# Patient Record
Sex: MALE | Race: BLACK OR AFRICAN AMERICAN | NOT HISPANIC OR LATINO | URBAN - METROPOLITAN AREA
[De-identification: names, ages, dates, MRNs, and addresses within clinical notes are randomized per-mention and may not be internally consistent; named-entity substitution may affect disease eponyms.]

---

## 2019-10-17 ENCOUNTER — EMERGENCY (EMERGENCY)
Facility: HOSPITAL | Age: 49
LOS: 0 days | Discharge: HOME | End: 2019-10-17
Attending: EMERGENCY MEDICINE
Payer: COMMERCIAL

## 2019-10-17 VITALS — HEIGHT: 72 IN | WEIGHT: 149.91 LBS

## 2019-10-17 VITALS
SYSTOLIC BLOOD PRESSURE: 123 MMHG | HEART RATE: 56 BPM | TEMPERATURE: 98 F | DIASTOLIC BLOOD PRESSURE: 82 MMHG | RESPIRATION RATE: 18 BRPM | OXYGEN SATURATION: 100 %

## 2019-10-17 DIAGNOSIS — S61.217A LACERATION WITHOUT FOREIGN BODY OF LEFT LITTLE FINGER WITHOUT DAMAGE TO NAIL, INITIAL ENCOUNTER: ICD-10-CM
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
## 2019-10-17 PROCEDURE — 99282 EMERGENCY DEPT VISIT SF MDM: CPT | Mod: 25

## 2019-10-17 PROCEDURE — 99283 EMERGENCY DEPT VISIT LOW MDM: CPT

## 2019-10-17 PROCEDURE — 12001 RPR S/N/AX/GEN/TRNK 2.5CM/<: CPT

## 2019-10-17 NOTE — ED STATDOCS - ATTENDING CONTRIBUTION TO CARE
I, Rena Simeon MD,  performed the initial face to face bedside interview with this patient regarding history of present illness, review of symptoms and relevant past medical, social and family history.  I completed an independent physical examination.  I was the initial provider who evaluated this patient. I have signed out the follow up of any pending tests (i.e. labs, radiological studies) to the ACP.  I have communicated the patient’s plan of care and disposition with the ACP.  The history, relevant review of systems, past medical and surgical history, medical decision making, and physical examination was documented by the scribe in my presence and I attest to the accuracy of the documentation.

## 2019-10-17 NOTE — ED STATDOCS - PATIENT PORTAL LINK FT
You can access the FollowMyHealth Patient Portal offered by [facility] by registering at the following website: [email or web address]. By joining [facility]’s FollowMyHealth portal, you will also be able to view your health information using other applications (apps) compatible with our system.

## 2019-10-17 NOTE — ED STATDOCS - PROGRESS NOTE DETAILS
48 yr. old male [facility]: None reported presents to ED with laceration to left 5th finger on knife at home today. Last Tdaap ?? Seen and examined by attending in intake. Plan: Irrigate and apply Dermabond. Will F/U with results and re evaluate. Meredith NP

## 2019-10-17 NOTE — ED STATDOCS - SKIN, MLM
+left 5th digit 1 cm laceration to distal lateral nail and tip of finger. Distal neuro motor intact. Not actively bleeding,

## 2019-10-17 NOTE — ED ADULT NURSE NOTE - SUICIDE SCREENING QUESTION 2
Spoke with pharmacy, regarding refill of atorvastatin 10 mg.  Pt has current refills on file  rx denied  
No

## 2019-10-17 NOTE — ED PROCEDURE NOTE - PROCEDURE ADDITIONAL DETAILS
5th Left Finger: Irrigated with saline, Demabond applied to 1.2 cm laceration of 5th distal phalanx through tip of nail. Tolerated well. MTangredi NP

## 2020-07-05 ENCOUNTER — EMERGENCY (EMERGENCY)
Facility: HOSPITAL | Age: 50
LOS: 0 days | Discharge: HOME | End: 2020-07-05
Attending: STUDENT IN AN ORGANIZED HEALTH CARE EDUCATION/TRAINING PROGRAM
Payer: COMMERCIAL

## 2020-07-05 VITALS
SYSTOLIC BLOOD PRESSURE: 128 MMHG | DIASTOLIC BLOOD PRESSURE: 85 MMHG | OXYGEN SATURATION: 100 % | TEMPERATURE: 98 F | RESPIRATION RATE: 16 BRPM | HEART RATE: 65 BPM

## 2020-07-05 VITALS — WEIGHT: 175.93 LBS | HEIGHT: 72 IN
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
## 2020-07-05 DIAGNOSIS — S61.216A LACERATION WITHOUT FOREIGN BODY OF RIGHT LITTLE FINGER WITHOUT DAMAGE TO NAIL, INITIAL ENCOUNTER: ICD-10-CM
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
## 2020-07-05 PROCEDURE — 12002 RPR S/N/AX/GEN/TRNK2.6-7.5CM: CPT

## 2020-07-05 PROCEDURE — 99282 EMERGENCY DEPT VISIT SF MDM: CPT | Mod: 25

## 2020-07-05 PROCEDURE — 99282 EMERGENCY DEPT VISIT SF MDM: CPT

## 2020-07-05 NOTE — ED STATDOCS - NSFOLLOWUPINSTRUCTIONS_ED_ALL_ED_FT
KEEP DRESSING ON X 24 HOURS. COVER WITH BANDAID AFTERWARDS. RETURN TO ED IN EVENT OF FEVER, SWELLING OF FINGER, INABILITY TO MOBILIZE FINGER.    Laceration    WHAT YOU NEED TO KNOW:    A laceration is an injury to the skin and the soft tissue underneath it. Lacerations happen when you are cut or hit by something. They can happen anywhere on the body.     DISCHARGE INSTRUCTIONS:    Return to the emergency department if:     You have heavy bleeding or bleeding that does not stop after 10 minutes of holding firm, direct pressure over the wound.       Your wound opens up.     Contact your healthcare provider if:     You have a fever or chills.       Your laceration is red, warm, or swollen.      You have red streaks on your skin coming from your wound.      You have white or yellow drainage from the wound that smells bad.      You have pain that gets worse, even after treatment.       You have questions or concerns about your condition or care.     Medicines:     Prescription pain medicine may be given. Ask how to take this medicine safely.       Antibiotics help treat or prevent a bacterial infection.       Take your medicine as directed. Contact your healthcare provider if you think your medicine is not helping or if you have side effects. Tell him or her if you are allergic to any medicine. Keep a list of the medicines, vitamins, and herbs you take. Include the amounts, and when and why you take them. Bring the list or the pill bottles to follow-up visits. Carry your medicine list with you in case of an emergency.    Care for your wound as directed:     Do not get your wound wet until your healthcare provider says it is okay. Do not soak your wound in water. Do not go swimming until your healthcare provider says it is okay. Carefully wash the wound with soap and water. Gently pat the area dry or allow it to air dry.       Change your bandages when they get wet, dirty, or after washing. Apply new, clean bandages as directed. Do not apply elastic bandages or tape too tight. Do not put powders or lotions over your incision.       Apply antibiotic ointment as directed. Your healthcare provider may give you antibiotic ointment to put over your wound if you have stitches. If you have strips of tape over your incision, let them dry up and fall off on their own. If they do not fall off within 14 days, gently remove them. If you have glue over your wound, do not remove or pick at it. If your glue comes off, do not replace it with glue that you have at home.       Check your wound every day for signs of infection such as swelling, redness, or pus.     Self-care:     Apply ice on your wound for 15 to 20 minutes every hour or as directed. Use an ice pack, or put crushed ice in a plastic bag. Cover it with a towel. Ice helps prevent tissue damage and decreases swelling and pain.      Use a splint as directed. A splint will decrease movement and stress on your wound. It may help it heal faster. A splint may be used for lacerations over joints or areas of your body that bend. Ask your healthcare provider how to apply and remove a splint.       Decrease scarring of your wound by applying ointments as directed. Do not apply ointments until your healthcare provider says it is okay. You may need to wait until your wound is healed. Ask which ointment to buy and how often to use it. After your wound is healed, use sunscreen over the area when you are out in the sun. You should do this for at least 6 months to 1 year after your injury.     Follow up with your healthcare provider as directed: You may need to follow up in 24 to 48 hours to have your wound checked for infection. You will need to return in 3 to 14 days if you have stitches or staples so they can be removed. Care for your wound as directed to prevent infection and help it heal. Write down your questions so you remember to ask them during your visits.

## 2020-07-05 NOTE — ED STATDOCS - PROGRESS NOTE DETAILS
48 y/o m with no PMH presents with right 5th finger laceration which occurred apx 30 minutes PTA. pt states he was moving a metal BBQ frame and he felt sharp pain in his finger. tetanus up to date. PE: Well appearing. MSK: +skin flap over palmar aspect 5th digit right hand with active bleeding. Sensation intact to light touch. Cap refill < 2 sec in upper extremities. Full ROM in UE digits. A/P: Laceration. Plan for repair and dc home. - Portillo Mejia PA-C

## 2020-07-05 NOTE — ED STATDOCS - SKIN, MLM
skin normal color for race, warm, dry and intact. skin normal color for race, warm, dry. +Less than .5 cm avulsion to distal R 5th digit volar aspect

## 2020-07-05 NOTE — ED ADULT NURSE NOTE - NSIMPLEMENTINTERV_GEN_ALL_ED
Implemented All Universal Safety Interventions:  [place] to call system. Call bell, personal items and telephone within reach. Instruct patient to call for assistance. Room bathroom lighting operational. Non-slip footwear when patient is off stretcher. Physically safe environment: no spills, clutter or unnecessary equipment. Stretcher in lowest position, wheels locked, appropriate side rails in place.

## 2020-07-05 NOTE — ED STATDOCS - PATIENT PORTAL LINK FT
You can access the FollowMyHealth Patient Portal offered by [facility] by registering at the following website: [email or web address]. By joining [facility]’s FollowMyHealth portal, you will also be able to view your health information using other applications (apps) compatible with our system.

## 2020-07-05 NOTE — ED STATDOCS - CLINICAL SUMMARY MEDICAL DECISION MAKING FREE TEXT BOX
50 y/o M presents to the ED with avulsion. Plan: Wound care 50 y/o M presents to the ED with skin avulsion. Plan: Wound care

## 2020-07-05 NOTE — ED STATDOCS - ATTENDING CONTRIBUTION TO CARE
I, Zenobia Villarreal DO,  performed the initial face to face bedside interview with this patient regarding history of present illness, review of symptoms and relevant past medical, social and family history.  I completed an independent physical examination.  I was the initial provider who evaluated this patient. I have signed out the follow up of any pending tests (i.e. labs, radiological studies) to the ACP.  I have communicated the patient’s plan of care and disposition with the ACP.  The history, relevant review of systems, past medical and surgical history, medical decision making, and physical examination was documented by the scribe in my presence and I attest to the accuracy of the documentation.

## 2020-07-05 NOTE — ED STATDOCS - OBJECTIVE STATEMENT
48 y/o M with no pertinent PMHx presenting to the ED c/o R finger laceration x30 minutes PTA.  Patient report that he was pulling metal grill when he sliced his R 5th digit. Came to the ED for possible repair. Denies numbness, tingling in finger. Allergic to penicillin. 48 y/o M with no pertinent PMHx presenting to the ED c/o R finger laceration x30 minutes PTA.  Patient report that he was pulling metal grill when he cut his R 5th digit. Came to the ED for possible repair. Patient is RHD. Denies numbness, tingling in finger. Allergic to penicillin. Tetanus shot is UTD.

## 2020-07-05 NOTE — ED ADULT TRIAGE NOTE - CHIEF COMPLAINT QUOTE
Pt comes in for right fifth finger laceration. Believes he cut it on a metal grill. Bleeding under control in triage. States he is utd on tetanus.

## 2020-07-06 PROBLEM — Z78.9 OTHER SPECIFIED HEALTH STATUS: Chronic | Status: ACTIVE | Noted: 2019-10-17

## 2021-03-09 ENCOUNTER — OUTPATIENT (OUTPATIENT)
Dept: OUTPATIENT SERVICES | Facility: HOSPITAL | Age: 51
LOS: 1 days | End: 2021-03-09
Payer: COMMERCIAL

## 2021-03-09 DIAGNOSIS — Z20.828 CONTACT WITH AND (SUSPECTED) EXPOSURE TO OTHER VIRAL COMMUNICABLE DISEASES: ICD-10-CM

## 2021-03-09 LAB — SARS-COV-2 RNA SPEC QL NAA+PROBE: SIGNIFICANT CHANGE UP

## 2021-03-09 PROCEDURE — U0003: CPT

## 2021-03-09 PROCEDURE — C9803: CPT

## 2021-03-09 PROCEDURE — U0005: CPT

## 2021-03-10 DIAGNOSIS — Z20.828 CONTACT WITH AND (SUSPECTED) EXPOSURE TO OTHER VIRAL COMMUNICABLE DISEASES: ICD-10-CM

## 2023-06-11 ENCOUNTER — INPATIENT (INPATIENT)
Facility: HOSPITAL | Age: 53
LOS: 1 days | Discharge: HOME | DRG: 74 | End: 2023-06-13
Attending: HOSPITALIST | Admitting: INTERNAL MEDICINE
Payer: COMMERCIAL

## 2023-06-11 VITALS
OXYGEN SATURATION: 100 % | WEIGHT: 174.61 LBS | RESPIRATION RATE: 16 BRPM | SYSTOLIC BLOOD PRESSURE: 130 MMHG | DIASTOLIC BLOOD PRESSURE: 72 MMHG | TEMPERATURE: 99 F | HEART RATE: 56 BPM

## 2023-06-11 DIAGNOSIS — I63.9 CEREBRAL INFARCTION, UNSPECIFIED: ICD-10-CM

## 2023-06-11 LAB
ALBUMIN SERPL ELPH-MCNC: 3.5 G/DL — SIGNIFICANT CHANGE UP (ref 3.3–5)
ALP SERPL-CCNC: 71 U/L — SIGNIFICANT CHANGE UP (ref 40–120)
ALT FLD-CCNC: 24 U/L — SIGNIFICANT CHANGE UP (ref 12–78)
ANION GAP SERPL CALC-SCNC: 5 MMOL/L — SIGNIFICANT CHANGE UP (ref 5–17)
APTT BLD: 32.5 SEC — SIGNIFICANT CHANGE UP (ref 27.5–35.5)
AST SERPL-CCNC: 9 U/L — LOW (ref 15–37)
BASOPHILS # BLD AUTO: 0.04 K/UL — SIGNIFICANT CHANGE UP (ref 0–0.2)
BASOPHILS NFR BLD AUTO: 0.7 % — SIGNIFICANT CHANGE UP (ref 0–2)
BILIRUB SERPL-MCNC: 0.6 MG/DL — SIGNIFICANT CHANGE UP (ref 0.2–1.2)
BUN SERPL-MCNC: 9 MG/DL — SIGNIFICANT CHANGE UP (ref 7–23)
CALCIUM SERPL-MCNC: 9.1 MG/DL — SIGNIFICANT CHANGE UP (ref 8.5–10.1)
CHLORIDE SERPL-SCNC: 103 MMOL/L — SIGNIFICANT CHANGE UP (ref 96–108)
CO2 SERPL-SCNC: 30 MMOL/L — SIGNIFICANT CHANGE UP (ref 22–31)
CREAT SERPL-MCNC: 1.11 MG/DL — SIGNIFICANT CHANGE UP (ref 0.5–1.3)
EGFR: 80 ML/MIN/1.73M2 — SIGNIFICANT CHANGE UP
EOSINOPHIL # BLD AUTO: 0.16 K/UL — SIGNIFICANT CHANGE UP (ref 0–0.5)
EOSINOPHIL NFR BLD AUTO: 2.7 % — SIGNIFICANT CHANGE UP (ref 0–6)
GLUCOSE SERPL-MCNC: 89 MG/DL — SIGNIFICANT CHANGE UP (ref 70–99)
HCT VFR BLD CALC: 43.5 % — SIGNIFICANT CHANGE UP (ref 39–50)
HGB BLD-MCNC: 14.3 G/DL — SIGNIFICANT CHANGE UP (ref 13–17)
IMM GRANULOCYTES NFR BLD AUTO: 0.7 % — SIGNIFICANT CHANGE UP (ref 0–0.9)
INR BLD: 1.11 RATIO — SIGNIFICANT CHANGE UP (ref 0.88–1.16)
LYMPHOCYTES # BLD AUTO: 2.72 K/UL — SIGNIFICANT CHANGE UP (ref 1–3.3)
LYMPHOCYTES # BLD AUTO: 46.7 % — HIGH (ref 13–44)
MCHC RBC-ENTMCNC: 28.7 PG — SIGNIFICANT CHANGE UP (ref 27–34)
MCHC RBC-ENTMCNC: 32.9 GM/DL — SIGNIFICANT CHANGE UP (ref 32–36)
MCV RBC AUTO: 87.2 FL — SIGNIFICANT CHANGE UP (ref 80–100)
MONOCYTES # BLD AUTO: 0.71 K/UL — SIGNIFICANT CHANGE UP (ref 0–0.9)
MONOCYTES NFR BLD AUTO: 12.2 % — SIGNIFICANT CHANGE UP (ref 2–14)
NEUTROPHILS # BLD AUTO: 2.15 K/UL — SIGNIFICANT CHANGE UP (ref 1.8–7.4)
NEUTROPHILS NFR BLD AUTO: 37 % — LOW (ref 43–77)
PLATELET # BLD AUTO: 180 K/UL — SIGNIFICANT CHANGE UP (ref 150–400)
POTASSIUM SERPL-MCNC: 4.3 MMOL/L — SIGNIFICANT CHANGE UP (ref 3.5–5.3)
POTASSIUM SERPL-SCNC: 4.3 MMOL/L — SIGNIFICANT CHANGE UP (ref 3.5–5.3)
PROT SERPL-MCNC: 8.1 GM/DL — SIGNIFICANT CHANGE UP (ref 6–8.3)
PROTHROM AB SERPL-ACNC: 12.9 SEC — SIGNIFICANT CHANGE UP (ref 10.5–13.4)
RBC # BLD: 4.99 M/UL — SIGNIFICANT CHANGE UP (ref 4.2–5.8)
RBC # FLD: 14.7 % — HIGH (ref 10.3–14.5)
SODIUM SERPL-SCNC: 138 MMOL/L — SIGNIFICANT CHANGE UP (ref 135–145)
TROPONIN I, HIGH SENSITIVITY RESULT: 5.11 NG/L — SIGNIFICANT CHANGE UP
TROPONIN I, HIGH SENSITIVITY RESULT: 6.45 NG/L — SIGNIFICANT CHANGE UP
WBC # BLD: 5.82 K/UL — SIGNIFICANT CHANGE UP (ref 3.8–10.5)
WBC # FLD AUTO: 5.82 K/UL — SIGNIFICANT CHANGE UP (ref 3.8–10.5)

## 2023-06-11 PROCEDURE — 93306 TTE W/DOPPLER COMPLETE: CPT

## 2023-06-11 PROCEDURE — 78452 HT MUSCLE IMAGE SPECT MULT: CPT

## 2023-06-11 PROCEDURE — 97162 PT EVAL MOD COMPLEX 30 MIN: CPT | Mod: GP

## 2023-06-11 PROCEDURE — 93010 ELECTROCARDIOGRAM REPORT: CPT

## 2023-06-11 PROCEDURE — 70551 MRI BRAIN STEM W/O DYE: CPT

## 2023-06-11 PROCEDURE — 99285 EMERGENCY DEPT VISIT HI MDM: CPT

## 2023-06-11 PROCEDURE — A9500: CPT

## 2023-06-11 PROCEDURE — 70498 CT ANGIOGRAPHY NECK: CPT | Mod: 26,MA

## 2023-06-11 PROCEDURE — 93017 CV STRESS TEST TRACING ONLY: CPT

## 2023-06-11 PROCEDURE — 85027 COMPLETE CBC AUTOMATED: CPT

## 2023-06-11 PROCEDURE — 71045 X-RAY EXAM CHEST 1 VIEW: CPT

## 2023-06-11 PROCEDURE — 80061 LIPID PANEL: CPT

## 2023-06-11 PROCEDURE — 0042T: CPT | Mod: MA

## 2023-06-11 PROCEDURE — 71045 X-RAY EXAM CHEST 1 VIEW: CPT | Mod: 26

## 2023-06-11 PROCEDURE — 82607 VITAMIN B-12: CPT

## 2023-06-11 PROCEDURE — G0378: CPT

## 2023-06-11 PROCEDURE — 97116 GAIT TRAINING THERAPY: CPT | Mod: GP

## 2023-06-11 PROCEDURE — 99223 1ST HOSP IP/OBS HIGH 75: CPT | Mod: GC

## 2023-06-11 PROCEDURE — 84443 ASSAY THYROID STIM HORMONE: CPT

## 2023-06-11 PROCEDURE — 80048 BASIC METABOLIC PNL TOTAL CA: CPT

## 2023-06-11 PROCEDURE — 92523 SPEECH SOUND LANG COMPREHEN: CPT | Mod: GN

## 2023-06-11 PROCEDURE — 70496 CT ANGIOGRAPHY HEAD: CPT | Mod: 26,MA

## 2023-06-11 PROCEDURE — 84484 ASSAY OF TROPONIN QUANT: CPT

## 2023-06-11 PROCEDURE — 82746 ASSAY OF FOLIC ACID SERUM: CPT

## 2023-06-11 PROCEDURE — 92610 EVALUATE SWALLOWING FUNCTION: CPT | Mod: GN

## 2023-06-11 PROCEDURE — 83036 HEMOGLOBIN GLYCOSYLATED A1C: CPT

## 2023-06-11 PROCEDURE — 36415 COLL VENOUS BLD VENIPUNCTURE: CPT

## 2023-06-11 RX ORDER — FERROUS SULFATE 325(65) MG
1 TABLET ORAL
Refills: 0 | DISCHARGE

## 2023-06-11 RX ORDER — POLYETHYLENE GLYCOL 3350 17 G/17G
17 POWDER, FOR SOLUTION ORAL DAILY
Refills: 0 | Status: DISCONTINUED | OUTPATIENT
Start: 2023-06-11 | End: 2023-06-13

## 2023-06-11 RX ORDER — SENNA PLUS 8.6 MG/1
2 TABLET ORAL AT BEDTIME
Refills: 0 | Status: DISCONTINUED | OUTPATIENT
Start: 2023-06-11 | End: 2023-06-13

## 2023-06-11 RX ORDER — ASPIRIN/CALCIUM CARB/MAGNESIUM 324 MG
324 TABLET ORAL ONCE
Refills: 0 | Status: COMPLETED | OUTPATIENT
Start: 2023-06-11 | End: 2023-06-11

## 2023-06-11 RX ORDER — CHOLECALCIFEROL (VITAMIN D3) 125 MCG
1 CAPSULE ORAL
Refills: 0 | DISCHARGE

## 2023-06-11 RX ORDER — MAGNESIUM OXIDE 400 MG ORAL TABLET 241.3 MG
400 TABLET ORAL DAILY
Refills: 0 | Status: DISCONTINUED | OUTPATIENT
Start: 2023-06-11 | End: 2023-06-13

## 2023-06-11 RX ORDER — MAGNESIUM OXIDE 400 MG ORAL TABLET 241.3 MG
1 TABLET ORAL
Refills: 0 | DISCHARGE

## 2023-06-11 RX ORDER — HEPARIN SODIUM 5000 [USP'U]/ML
5000 INJECTION INTRAVENOUS; SUBCUTANEOUS EVERY 12 HOURS
Refills: 0 | Status: DISCONTINUED | OUTPATIENT
Start: 2023-06-11 | End: 2023-06-13

## 2023-06-11 RX ORDER — FERROUS SULFATE 325(65) MG
325 TABLET ORAL DAILY
Refills: 0 | Status: DISCONTINUED | OUTPATIENT
Start: 2023-06-11 | End: 2023-06-13

## 2023-06-11 RX ORDER — ATORVASTATIN CALCIUM 80 MG/1
80 TABLET, FILM COATED ORAL AT BEDTIME
Refills: 0 | Status: DISCONTINUED | OUTPATIENT
Start: 2023-06-11 | End: 2023-06-13

## 2023-06-11 RX ADMIN — Medication 324 MILLIGRAM(S): at 17:53

## 2023-06-11 NOTE — PATIENT PROFILE ADULT - STATED REASON FOR ADMISSION
Lt thumb/hand numbness and tingling   Lt thumb/hand numbness and tingling Toes also Chest pressure

## 2023-06-11 NOTE — ED ADULT NURSE NOTE - CHIEF COMPLAINT QUOTE
sudden onset numbness left thumb 11am. progressed to index and now up arm. pt having blurry vision and "slight CP." no facial droop, no arm drift, no aphasia. ambulatory

## 2023-06-11 NOTE — ED ADULT NURSE NOTE - OBJECTIVE STATEMENT
52 year old male found laying on stretcher complaining of arm and hand numbness that has progressed up since arm since 11am with no improvement.

## 2023-06-11 NOTE — ED PROVIDER NOTE - CARE PLAN
1 Principal Discharge DX:	CVA (cerebrovascular accident)  Secondary Diagnosis:	Numbness and tingling

## 2023-06-11 NOTE — H&P ADULT - ATTENDING COMMENTS
52 year old male w hx anemia, active   who came to ED c/o paresthesia to L thumb and L toe, then tightness of arm radiating to chest    CT head no acute changes  CTA H+N no LVO  EKG NSR  telemetry HR 40-60 sinus    since ED presentation after ASA, feels improvement in paresthesias although still present    #Paresthesias  poss TIA  NIHSS 0  CT and CT H+N/perf reviewed  1. admit to telemetry  2. neuro check q 4 hrs  3. MRI  4. ECHO  5. neuro  6. asa   7. statin  8. check lipids and Hb A1c  9. check B12, folate, TSH      #Chest tightness  1. serial trop  2. asa  3. statin      #Anemia  states FHx in father  recently started iron  why anemic not clear      #VTE  as per orders 52 year old male w hx anemia, active   who came to ED c/o paresthesia to L thumb and L toe, then tightness of arm radiating to chest    CT head no acute changes  CTA H+N no LVO  EKG NSR  telemetry HR 40-60 sinus    since ED presentation after ASA, feels improvement in paresthesias although still present    #Paresthesias  poss TIA  NIHSS 0  CT and CT H+N/ perf reviewed  1. admit to telemetry  2. neuro check q 4 hrs  3. MRI brain  4. ECHO  5. neuro consult  6. asa 81 mg  7. statin 80 mg  8. check lipids and Hb A1c  9. check B12, folate, TSH      #Chest tightness  1. serial trop  2. asa  3. statin  4. EKG  NSR  5. echo as above      #hx Anemia  states FHx in father  pt states recently started iron  why anemic not clear       current Hb nl w nl indices      #VTE  as per orders

## 2023-06-11 NOTE — H&P ADULT - ASSESSMENT
DVT ppx   -  #TIA/rule out stroke   - Experienced paresthesias in upper and lower extremity - now improving   - CT head and CT angio head/neck unremarkable as above   - Maintain aspiration precautions and keep HOB elevated   - F/u lipid panel and HgbA1c in AM  - Start atorvastatin 80 mg at bedtime  - F/u neuro consult   - F/u cardio consult   - F/u PT   - F/u speech pathology       #Substernal Chest pain   #Rule out ACS   - Chest pain now resolved   - Family Hx - father passed away with stroke in his 70s - had hx of HLD and T2DM  - EKG - normal sinus rhythm   - Troponin negative x 2; f/u third troponin   - F/u ECHO   - EKG in AM   - Cardiology consult   - Tele monitoring     #DVT ppx  - Heparin SubQ    #Code Status   - FULL CODE    Above discussed with Dr. Arevalo.    #TIA/rule out stroke   - Experienced paresthesias in upper and lower extremity - now improving   - CT head and CT angio head/neck unremarkable as above   - Maintain aspiration precautions and keep HOB elevated   - F/u lipid panel and HgbA1c in AM  - Start atorvastatin 80 mg at bedtime  - F/u MRI of head  - F/u neuro consult   - F/u cardio consult   - F/u PT   - F/u speech pathology       #Substernal Chest pain   #Rule out ACS   - Chest pain now resolved   - Family Hx - father passed away with stroke in his 70s - had hx of HLD and T2DM  - EKG - normal sinus rhythm   - Troponin negative x 2; f/u third troponin   - F/u ECHO   - EKG in AM   - Cardiology consult   - Tele monitoring     #DVT ppx  - Heparin SubQ    #Code Status   - FULL CODE    Above discussed with Dr. Arevalo.

## 2023-06-11 NOTE — H&P ADULT - NSHPSOCIALHISTORY_GEN_ALL_CORE
- lives with wife   Occupation:  in [place]   Never uses tobacco, recreational drugs, or alcohol   Fully-functional at home  - lives with wife   Occupation:  in [employer]; also a    Never uses tobacco, recreational drugs, or alcohol   Fully-functional at home

## 2023-06-11 NOTE — ED PROVIDER NOTE - CLINICAL SUMMARY MEDICAL DECISION MAKING FREE TEXT BOX
51 yo M with paresthesia of L hand and L arm, mild CP, and paresthesia of L medial foot. NIHSS 0, outside of TPA window, no LVO imaging, no medical hx, but family hx of +CVA. Will need full cardiac and CVA workup, likely admission. 53 yo M with paresthesia of L hand and L arm, mild CP, and paresthesia of L medial foot. NIHSS 0, outside of TPA window, no LVO imaging, no medical hx, but family hx of +CVA. Will need full cardiac and CVA workup, likely admission.  -workup unremarkable. Pt remains at baseline, sx not worsening. CTA w/o LVO, no acute abnormalities. Pt will require admission for further CVA workup

## 2023-06-11 NOTE — ED ADULT NURSE NOTE - NSFALLUNIVINTERV_ED_ALL_ED
Bed/Stretcher in lowest position, wheels locked, appropriate side rails in place/Call bell, personal items and telephone in reach/Instruct patient to call for assistance before getting out of bed/chair/stretcher/Non-slip footwear applied when patient is off stretcher/[place] to call system/Physically safe environment - no spills, clutter or unnecessary equipment/Purposeful proactive rounding/Room/bathroom lighting operational, light cord in reach

## 2023-06-11 NOTE — ED PROVIDER NOTE - OBJECTIVE STATEMENT
53 yo M with no pertinent PMHx presents to the ED c/o LUE numbness x 11 am today, started from L thumb that gradually spread up to L arm.  Not on AC. 51 yo M with no pertinent PMHx presents to the ED c/o mid CP, LUE numbness, tingling, and tightness x 11 am today, started from L thumb that gradually spread up to L arm. States he was not doing anything in particular. Also c/o slight numbness to L toe. Not on AC. Denies chest tightness, SOB, vomiting, lower extremities pain, facial tingling or numbness. Reports nothing out of the ordinary this week, no recent travels, no recent falls or trauma. Denies any medical hx, denies EtOH, drugs, or smoker. Reports that father had a stroke in his mid 60s. Reports similar tightness and tingling pain when pt gets acid reflux, but the numbness is new so he came to ED for further evaluation.

## 2023-06-11 NOTE — H&P ADULT - NSHPPHYSICALEXAM_GEN_ALL_CORE
T(C): 36.8 (06-11-23 @ 19:18), Max: 37.1 (06-11-23 @ 16:31)  HR: 59 (06-11-23 @ 19:18) (56 - 59)  BP: 131/77 (06-11-23 @ 19:18) (130/72 - 131/77)  RR: 18 (06-11-23 @ 19:18) (16 - 18)  SpO2: 99% (06-11-23 @ 19:18) (99% - 100%)    CONSTITUTIONAL: Well groomed, no apparent distress  EYES: EOMI, conjunctiva clear and sclera white  ENMT: Oral mucosa with moist membranes   NECK: Supple  RESP: No respiratory distress, no use of accessory muscles  CV: RRR, +S1S2  GI: Soft, NT, ND, no rebound, no guarding  MSK: Normal ROM without pain in all 4 extremities, no spinal tenderness  SKIN: No rashes or ulcers noted  NEURO:  sensation intact in upper and lower extremities b/l to light touch   PSYCH: Appropriate insight/judgment; A+O x 3 T(C): 36.8 (06-11-23 @ 19:18), Max: 37.1 (06-11-23 @ 16:31)  HR: 59 (06-11-23 @ 19:18) (56 - 59)  BP: 131/77 (06-11-23 @ 19:18) (130/72 - 131/77)  RR: 18 (06-11-23 @ 19:18) (16 - 18)  SpO2: 99% (06-11-23 @ 19:18) (99% - 100%)    CONSTITUTIONAL: Well groomed, no apparent distress  EYES: EOMI, conjunctiva clear and sclera white  ENMT: Oral mucosa with moist membranes   NECK: Supple, no bruits  RESP: No respiratory distress, no use of accessory muscles  CV: RRR, +S1S2  GI: Soft, NT, ND, no rebound, no guarding  MSK: Normal ROM without pain in all 4 extremities, no spinal tenderness  SKIN: No rashes or ulcers noted  NEURO:  sensation intact in upper and lower extremities b/l to light touch   PSYCH: Appropriate insight/judgment; A+O x 3

## 2023-06-11 NOTE — H&P ADULT - HISTORY OF PRESENT ILLNESS
Patient is a 53 y/o M with no significant PMH presening to the ED on 6/11/23 for     Upon arrival to the ED, /72 HR 56 RR 16 and T 98.8 F.  NIHSS of 0. CT Brain Perfusion with no evidence of perfusion deficit. CT Angio  Neck/Brain Stroke protocol with no evidence of large vessel occlusion, significant stenosis, vascular malformation or aneurysm within the neck/intracranial circulation. He received 324 mg of aspirin.  Patient is a 51 y/o M with no significant PMH presening to the ED on 6/11/23 for upper extremity numbness that started in his thumbs and radiated up the arms (L sided more than R) as well as L toe numbness. He also mentions a tightness in his L arm that then radiated to his chest (substernal location). At the time of my evaluation he reports that most of his symptoms have resolved except for some residual numbness in his fingers. He denies palpitations, shortness of breath, nausea, vomiting, headaches, dizziness, abdominal pain, urinary frequency/dysuria, sudden confusion, trouble speaking, vision problems, and loss of balance.     Upon arrival to the ED, /72 HR 56 RR 16 and T 98.8 F.  NIHSS of 0. EKG was normal sinus rhythm. CT Brain Perfusion with no evidence of perfusion deficit. CT Angio  Neck/Brain Stroke protocol with no evidence of large vessel occlusion, significant stenosis, vascular malformation or aneurysm within the neck/intracranial circulation. He received 324 mg of aspirin.     No prior hospital admissions.    Patient is a 53 y/o M with no significant PMH presenting to the ED on 6/11/23 for upper extremity numbness that started in his thumbs and radiated up the arms (L sided more than R) as well as L toe numbness.    He also mentions a tightness in his L arm that then radiated to his chest (substernal location). At the time of my evaluation he reports that most of his symptoms have resolved except for some residual numbness in his fingers. He denies palpitations, shortness of breath, nausea, vomiting, headaches, dizziness, abdominal pain, urinary frequency/dysuria, sudden confusion, trouble speaking, vision problems, and loss of balance.     Upon arrival to the ED, /72 HR 56 RR 16 and T 98.8 F.  NIHSS of 0. EKG was normal sinus rhythm. CT Brain Perfusion with no evidence of perfusion deficit. CT Angio  Neck/Brain Stroke protocol with no evidence of large vessel occlusion, significant stenosis, vascular malformation or aneurysm within the neck/intracranial circulation. He received 324 mg of aspirin.     No prior hospital admissions.

## 2023-06-11 NOTE — PHARMACOTHERAPY INTERVENTION NOTE - COMMENTS
Attempted to contact patient at [phone]. Unavailable. Left detailed msg on voice identified answering machine to return call.    Please read today's result letter to patient.     Medication history complete. Medications and allergies reviewed with patient and confirmed with . Patient states that he is not currently taking prescription medications.

## 2023-06-11 NOTE — ED ADULT TRIAGE NOTE - CHIEF COMPLAINT QUOTE
sudden onset numbness left thumb 11am. progressed to index and now up arm. pt having blurry vision and "slight CP." no facial droop, no arm drift, no aphasia. ambulatory sudden onset numbness left thumb 11am. progressed to index and now up arm. pt having blurry vision and "slight CP." no facial droop, no arm drift, no aphasia. ambulatory. Dr Pires to [place]ilda, code stroke called

## 2023-06-11 NOTE — ED STATDOCS - PROGRESS NOTE DETAILS
Shazia Piña for attending Dr. Pires: 53 y/o male presents to the ED c/o LUE numbness. pt reports around 11am today he had onset of numbness to his left thumb that gradually spread to the rest of his fingers and up his arm. Not on anticoagulation. Will send pt to main ED for further evaluation.

## 2023-06-11 NOTE — H&P ADULT - NSICDXFAMILYHX_GEN_ALL_CORE_FT
FAMILY HISTORY:  Father  Still living? No  Family history of stroke, Age at diagnosis: Age Unknown    Mother  Still living? Yes, Estimated age: Age Unknown  Family hx of colon cancer, Age at diagnosis: Age Unknown

## 2023-06-11 NOTE — ED PROVIDER NOTE - NS ED ROS FT
Constitutional: No fevers, chills, or sweats.  Cardiac: No chest pain, exertional dyspnea, orthopnea  Respiratory: No shortness of breath, no cough  GI: No abdominal pain, no N/V/D  Neuro: No headaches, no neck pain/stiffness, + numbness RUE  All other systems reviewed and are negative unless otherwise stated in the HPI. Constitutional: No fevers, chills, or sweats.  Cardiac: No chest tightness, exertional dyspnea, orthopnea, + mid CP  Respiratory: No shortness of breath, no cough  GI: No abdominal pain, no N/V/D  Neuro: No headaches, +LUE numbness, tingling, and tightness   All other systems reviewed and are negative unless otherwise stated in the HPI.

## 2023-06-11 NOTE — PATIENT PROFILE ADULT - FALL HARM RISK - UNIVERSAL INTERVENTIONS
Bed in lowest position, wheels locked, appropriate side rails in place/Call bell, personal items and telephone in reach/Instruct patient to call for assistance before getting out of bed or chair/Non-slip footwear when patient is out of bed/[place] to call system/Physically safe environment - no spills, clutter or unnecessary equipment/Purposeful Proactive Rounding/Room/bathroom lighting operational, light cord in reach

## 2023-06-11 NOTE — ED PROVIDER NOTE - PHYSICAL EXAMINATION
General: AAOx3, NAD  HEENT: NCAT  Cardiac: Normal rate and rhythm, no murmurs, normal peripheral perfusion  Respiratory: Normal rate and effort. CTAB  GI: Soft, nondistended, nontender  Neuro: No focal deficits. FAUSTIN equally x4, sensation to light touch intact throughout  MSK: FROMx4, no focal bony tenderness, no peripheral edema  Skin: No rash General: AAOx3, NAD  HEENT: NCAT  Cardiac: Normal rate and rhythm, no murmurs, normal peripheral perfusion  Respiratory: Normal rate and effort. CTAB  GI: Soft, nondistended, nontender  Neuro: No focal deficits. FAUSTIN equally x4, paresthesia and subjective sensory asymmetry in L medial hand and L medial foot compared to R, but sensation to light touch intact throughout. CN2-12 intact, speech clear, strength 5/5 in all extremities. NIHSS=0.  MSK: FROMx4, no focal bony tenderness, no peripheral edema  Skin: No rash

## 2023-06-12 DIAGNOSIS — E78.5 HYPERLIPIDEMIA, UNSPECIFIED: ICD-10-CM

## 2023-06-12 DIAGNOSIS — R07.9 CHEST PAIN, UNSPECIFIED: ICD-10-CM

## 2023-06-12 DIAGNOSIS — R07.89 OTHER CHEST PAIN: ICD-10-CM

## 2023-06-12 LAB
A1C WITH ESTIMATED AVERAGE GLUCOSE RESULT: 5.9 % — HIGH (ref 4–5.6)
ANION GAP SERPL CALC-SCNC: 3 MMOL/L — LOW (ref 5–17)
BUN SERPL-MCNC: 8 MG/DL — SIGNIFICANT CHANGE UP (ref 7–23)
CALCIUM SERPL-MCNC: 9.1 MG/DL — SIGNIFICANT CHANGE UP (ref 8.5–10.1)
CHLORIDE SERPL-SCNC: 103 MMOL/L — SIGNIFICANT CHANGE UP (ref 96–108)
CHOLEST SERPL-MCNC: 208 MG/DL — HIGH
CO2 SERPL-SCNC: 31 MMOL/L — SIGNIFICANT CHANGE UP (ref 22–31)
CREAT SERPL-MCNC: 1.07 MG/DL — SIGNIFICANT CHANGE UP (ref 0.5–1.3)
EGFR: 84 ML/MIN/1.73M2 — SIGNIFICANT CHANGE UP
ESTIMATED AVERAGE GLUCOSE: 123 MG/DL — HIGH (ref 68–114)
FOLATE SERPL-MCNC: 9.4 NG/ML — SIGNIFICANT CHANGE UP
GLUCOSE SERPL-MCNC: 91 MG/DL — SIGNIFICANT CHANGE UP (ref 70–99)
HCT VFR BLD CALC: 45.9 % — SIGNIFICANT CHANGE UP (ref 39–50)
HDLC SERPL-MCNC: 69 MG/DL — SIGNIFICANT CHANGE UP
HGB BLD-MCNC: 15.1 G/DL — SIGNIFICANT CHANGE UP (ref 13–17)
LIPID PNL WITH DIRECT LDL SERPL: 124 MG/DL — HIGH
MCHC RBC-ENTMCNC: 29.2 PG — SIGNIFICANT CHANGE UP (ref 27–34)
MCHC RBC-ENTMCNC: 32.9 GM/DL — SIGNIFICANT CHANGE UP (ref 32–36)
MCV RBC AUTO: 88.8 FL — SIGNIFICANT CHANGE UP (ref 80–100)
NON HDL CHOLESTEROL: 139 MG/DL — HIGH
PLATELET # BLD AUTO: 198 K/UL — SIGNIFICANT CHANGE UP (ref 150–400)
POTASSIUM SERPL-MCNC: 4.1 MMOL/L — SIGNIFICANT CHANGE UP (ref 3.5–5.3)
POTASSIUM SERPL-SCNC: 4.1 MMOL/L — SIGNIFICANT CHANGE UP (ref 3.5–5.3)
RBC # BLD: 5.17 M/UL — SIGNIFICANT CHANGE UP (ref 4.2–5.8)
RBC # FLD: 14.7 % — HIGH (ref 10.3–14.5)
SODIUM SERPL-SCNC: 137 MMOL/L — SIGNIFICANT CHANGE UP (ref 135–145)
TRIGL SERPL-MCNC: 73 MG/DL — SIGNIFICANT CHANGE UP
TROPONIN I, HIGH SENSITIVITY RESULT: 6.57 NG/L — SIGNIFICANT CHANGE UP
TSH SERPL-MCNC: 2.9 UU/ML — SIGNIFICANT CHANGE UP (ref 0.34–4.82)
VIT B12 SERPL-MCNC: 715 PG/ML — SIGNIFICANT CHANGE UP (ref 232–1245)
WBC # BLD: 4.43 K/UL — SIGNIFICANT CHANGE UP (ref 3.8–10.5)
WBC # FLD AUTO: 4.43 K/UL — SIGNIFICANT CHANGE UP (ref 3.8–10.5)

## 2023-06-12 PROCEDURE — 99223 1ST HOSP IP/OBS HIGH 75: CPT

## 2023-06-12 PROCEDURE — 70551 MRI BRAIN STEM W/O DYE: CPT | Mod: 26

## 2023-06-12 PROCEDURE — 99232 SBSQ HOSP IP/OBS MODERATE 35: CPT | Mod: GC

## 2023-06-12 PROCEDURE — 93306 TTE W/DOPPLER COMPLETE: CPT | Mod: 26

## 2023-06-12 RX ORDER — ASPIRIN/CALCIUM CARB/MAGNESIUM 324 MG
1 TABLET ORAL
Qty: 30 | Refills: 0
Start: 2023-06-12 | End: 2023-07-11

## 2023-06-12 RX ORDER — LANOLIN ALCOHOL/MO/W.PET/CERES
5 CREAM (GRAM) TOPICAL ONCE
Refills: 0 | Status: COMPLETED | OUTPATIENT
Start: 2023-06-12 | End: 2023-06-12

## 2023-06-12 RX ORDER — ASPIRIN/CALCIUM CARB/MAGNESIUM 324 MG
81 TABLET ORAL DAILY
Refills: 0 | Status: DISCONTINUED | OUTPATIENT
Start: 2023-06-12 | End: 2023-06-13

## 2023-06-12 RX ORDER — ATORVASTATIN CALCIUM 80 MG/1
1 TABLET, FILM COATED ORAL
Qty: 30 | Refills: 0
Start: 2023-06-12 | End: 2023-07-11

## 2023-06-12 RX ADMIN — ATORVASTATIN CALCIUM 80 MILLIGRAM(S): 80 TABLET, FILM COATED ORAL at 00:36

## 2023-06-12 RX ADMIN — Medication 325 MILLIGRAM(S): at 09:28

## 2023-06-12 RX ADMIN — Medication 81 MILLIGRAM(S): at 09:28

## 2023-06-12 RX ADMIN — Medication 5 MILLIGRAM(S): at 22:27

## 2023-06-12 RX ADMIN — ATORVASTATIN CALCIUM 80 MILLIGRAM(S): 80 TABLET, FILM COATED ORAL at 22:27

## 2023-06-12 RX ADMIN — POLYETHYLENE GLYCOL 3350 17 GRAM(S): 17 POWDER, FOR SOLUTION ORAL at 09:28

## 2023-06-12 RX ADMIN — MAGNESIUM OXIDE 400 MG ORAL TABLET 400 MILLIGRAM(S): 241.3 TABLET ORAL at 09:28

## 2023-06-12 NOTE — CONSULT NOTE ADULT - ASSESSMENT
51 y/o ma with no significant PMH presenting to the ED on 6/11/23 for upper extremities numbness, resolved. Non focal exam, CT/MR head showed no acute changes, CT angios no LVO. Doubt ischemic event. Poss CTS.  Suggest:  can f/u with my office when d/c  no further neuro recommendations

## 2023-06-12 NOTE — PROGRESS NOTE ADULT - SUBJECTIVE AND OBJECTIVE BOX
Pt has been seen and examined with FP resident, resident supervised agree with a/p       Patient is a 52y old  Male who presents with a chief complaint of TIA/rule out CVA (12 Jun 2023 14:45)      PHYSICAL EXAM:  Vital Signs Last 24 Hrs  T(C): 36.6 (11 Jun 2023 23:51), Max: 37.1 (11 Jun 2023 16:31)  T(F): 97.9 (11 Jun 2023 23:51), Max: 98.8 (11 Jun 2023 16:31)  HR: 52 (12 Jun 2023 08:36) (50 - 59)  BP: 115/84 (12 Jun 2023 08:36) (115/84 - 131/77)  BP(mean): 95 (11 Jun 2023 21:55) (93 - 95)  RR: 18 (12 Jun 2023 08:36) (16 - 18)  SpO2: 100% (12 Jun 2023 08:36) (99% - 100%)    Parameters below as of 12 Jun 2023 08:36  Patient On (Oxygen Delivery Method): room air      -rs-aeeb, cta  -cvs-s1s2 normal   -p/a-soft,bs+      A/P    #d/c today with further management as an outpt, if cleared by neurology and cardiology     #time spent 45 minutes  Pt has been seen and examined with FP resident, resident supervised agree with a/p       Patient is a 52y old  Male who presents with a chief complaint of TIA/rule out CVA (12 Jun 2023 14:45)      PHYSICAL EXAM:  Vital Signs Last 24 Hrs  T(C): 36.6 (11 Jun 2023 23:51), Max: 37.1 (11 Jun 2023 16:31)  T(F): 97.9 (11 Jun 2023 23:51), Max: 98.8 (11 Jun 2023 16:31)  HR: 52 (12 Jun 2023 08:36) (50 - 59)  BP: 115/84 (12 Jun 2023 08:36) (115/84 - 131/77)  BP(mean): 95 (11 Jun 2023 21:55) (93 - 95)  RR: 18 (12 Jun 2023 08:36) (16 - 18)  SpO2: 100% (12 Jun 2023 08:36) (99% - 100%)    Parameters below as of 12 Jun 2023 08:36  Patient On (Oxygen Delivery Method): room air      -rs-aeeb, cta  -cvs-s1s2 normal   -p/a-soft,bs+      A/P    #d/c today with further management as an outpt, if cleared by neurology and cardiology     #time spent 45 minutes     #Prediabetic -educated pt about it -life style modification advised

## 2023-06-12 NOTE — CONSULT NOTE ADULT - SUBJECTIVE AND OBJECTIVE BOX
CHIEF COMPLAINT: Patient is a 52y old  Male who presents with a chief complaint of TIA/rule out CVA (12 Jun 2023 11:16)      HPI:  Patient is a 53 y/o M with no significant PMH presenting to the ED on 6/11/23 for upper extremity numbness that started in his thumbs and radiated up the arms (L sided more than R) as well as L toe numbness.    He also mentions a tightness in his L arm that then radiated to his chest (substernal location). At the time of my evaluation he reports that most of his symptoms have resolved except for some residual numbness in his fingers. He denies palpitations, shortness of breath, nausea, vomiting, headaches, dizziness, abdominal pain, urinary frequency/dysuria, sudden confusion, trouble speaking, vision problems, and loss of balance.     Upon arrival to the ED, /72 HR 56 RR 16 and T 98.8 F.  NIHSS of 0. EKG was normal sinus rhythm. CT Brain Perfusion with no evidence of perfusion deficit. CT Angio  Neck/Brain Stroke protocol with no evidence of large vessel occlusion, significant stenosis, vascular malformation or aneurysm within the neck/intracranial circulation. He received 324 mg of aspirin.     Cardiology consulted to evaluate for chest pain. Pt. with non exertional right chest pressure associated with left arm numbness that lasted for  few hrs yesterday, cardiac enzymes negative x 3, chest pain free, denies SOB/palpitations, MRI - no CVA. Pt. with no previous cardiac Hx or cardiac Fx.       No prior hospital admissions.    (11 Jun 2023 20:14)      PAST MEDICAL / SURGICAL HISTORY:  PAST MEDICAL & SURGICAL HISTORY:  No pertinent past medical history      No significant past surgical history          SOCIAL HISTORY:   Alcohol: Denied  Smoking: Nonsmoker  Drug Use: Denied  Marital Status:     FAMILY HISTORY: FAMILY HISTORY:  Family history of stroke (Father)    Family hx of colon cancer (Mother)        MEDICATIONS  (STANDING):  aspirin  chewable 81 milliGRAM(s) Oral daily  atorvastatin 80 milliGRAM(s) Oral at bedtime  ferrous    sulfate 325 milliGRAM(s) Oral daily  heparin   Injectable 5000 Unit(s) SubCutaneous every 12 hours  magnesium oxide 400 milliGRAM(s) Oral daily  polyethylene glycol 3350 17 Gram(s) Oral daily  senna 2 Tablet(s) Oral at bedtime    MEDICATIONS  (PRN):      Allergies    penicillins (Unknown)    Intolerances        REVIEW OF SYSTEMS:  CONSTITUTIONAL: No weakness, fevers or chills  Eyes: No visual changes  NECK: No pain or stiffness  RESPIRATORY: No cough, wheezing, hemoptysis; No shortness of breath  CARDIOVASCULAR: + chest pain or palpitations  GASTROINTESTINAL: No abdominal pain. No nausea, vomiting, or hematemesis; No diarrhea or constipation. No melena or hematochezia.  GENITOURINARY: No dysuria, frequency or hematuria  NEUROLOGICAL: + UE numbness.  SKIN: No itching or rash  All other review of systems is negative unless indicated above    VITAL SIGNS:   Vital Signs Last 24 Hrs  T(C): 36.6 (11 Jun 2023 23:51), Max: 37.1 (11 Jun 2023 16:31)  T(F): 97.9 (11 Jun 2023 23:51), Max: 98.8 (11 Jun 2023 16:31)  HR: 52 (12 Jun 2023 08:36) (50 - 59)  BP: 115/84 (12 Jun 2023 08:36) (115/84 - 131/77)  BP(mean): 95 (11 Jun 2023 21:55) (93 - 95)  RR: 18 (12 Jun 2023 08:36) (16 - 18)  SpO2: 100% (12 Jun 2023 08:36) (99% - 100%)    Parameters below as of 12 Jun 2023 08:36  Patient On (Oxygen Delivery Method): room air        I&O's Summary      PHYSICAL EXAM:  Constitutional: NAD, awake and alert  HEENT:  EOMI,  Pupils round, No oral cyanosis.  Pulmonary: Non-labored, breath sounds are clear bilaterally, No wheezing, rales or rhonchi  Cardiovascular: S1 and S2, regular rate and rhythm, no Murmurs, gallops or rubs  Gastrointestinal: Bowel Sounds present, soft, nontender.   Lymph: No peripheral edema. No cervical lymphadenopathy.  Neurological: Alert, no focal deficits  Skin: No rashes.  Psych:  Mood & affect appropriate    LABS:                        15.1   4.43  )-----------( 198      ( 12 Jun 2023 08:24 )             45.9                         14.3   5.82  )-----------( 180      ( 11 Jun 2023 16:42 )             43.5     12 Jun 2023 08:24    137    |  103    |  8      ----------------------------<  91     4.1     |  31     |  1.07   11 Jun 2023 16:42    138    |  103    |  9      ----------------------------<  89     4.3     |  30     |  1.11     Ca    9.1        12 Jun 2023 08:24  Ca    9.1        11 Jun 2023 16:42    TPro  8.1    /  Alb  3.5    /  TBili  0.6    /  DBili  x      /  AST  9      /  ALT  24     /  AlkPhos  71     11 Jun 2023 16:42    PT/INR - ( 11 Jun 2023 16:42 )   PT: 12.9 sec;   INR: 1.11 ratio         PTT - ( 11 Jun 2023 16:42 )  PTT:32.5 sec        06-12 @ 08:24  TSH: 2.90    Radiology/Echo: reviewed    < from: MR Head No Cont (06.12.23 @ 09:25) >  IMPRESSION: No acute hemorrhage mass mass effect or acute territorial   infarcts seen.    --- End of Report ---    < end of copied text >     CHIEF COMPLAINT: Patient is a 52y old  Male who presents with a chief complaint of TIA/rule out CVA (12 Jun 2023 11:16)      HPI:  Patient is a 53 y/o M with no significant PMH presenting to the ED on 6/11/23 for upper extremity numbness that started in his thumbs and radiated up the arms (L sided more than R) as well as L toe numbness.    He also mentions a tightness in his L arm that then radiated to his chest (substernal location). At the time of my evaluation he reports that most of his symptoms have resolved except for some residual numbness in his fingers. He denies palpitations, shortness of breath, nausea, vomiting, headaches, dizziness, abdominal pain, urinary frequency/dysuria, sudden confusion, trouble speaking, vision problems, and loss of balance.     Upon arrival to the ED, /72 HR 56 RR 16 and T 98.8 F.  NIHSS of 0. EKG was normal sinus rhythm. CT Brain Perfusion with no evidence of perfusion deficit. CT Angio  Neck/Brain Stroke protocol with no evidence of large vessel occlusion, significant stenosis, vascular malformation or aneurysm within the neck/intracranial circulation. He received 324 mg of aspirin.     Cardiology consulted to evaluate for chest pain. Pt. with non exertional right chest pressure associated with left arm numbness that lasted for  few hrs yesterday, cardiac enzymes negative x 3, chest pain free, positive Fx - father with heart attack in his mid 60s.     No prior hospital admissions.    (11 Jun 2023 20:14)      PAST MEDICAL / SURGICAL HISTORY:  PAST MEDICAL & SURGICAL HISTORY:  No pertinent past medical history      No significant past surgical history          SOCIAL HISTORY:   Alcohol: Denied  Smoking: Nonsmoker  Drug Use: Denied  Marital Status:     FAMILY HISTORY: FAMILY HISTORY:  Family history of stroke (Father)    Family hx of colon cancer (Mother)        MEDICATIONS  (STANDING):  aspirin  chewable 81 milliGRAM(s) Oral daily  atorvastatin 80 milliGRAM(s) Oral at bedtime  ferrous    sulfate 325 milliGRAM(s) Oral daily  heparin   Injectable 5000 Unit(s) SubCutaneous every 12 hours  magnesium oxide 400 milliGRAM(s) Oral daily  polyethylene glycol 3350 17 Gram(s) Oral daily  senna 2 Tablet(s) Oral at bedtime    MEDICATIONS  (PRN):      Allergies    penicillins (Unknown)    Intolerances        REVIEW OF SYSTEMS:  CONSTITUTIONAL: No weakness, fevers or chills  Eyes: No visual changes  NECK: No pain or stiffness  RESPIRATORY: No cough, wheezing, hemoptysis; No shortness of breath  CARDIOVASCULAR: + chest pain or palpitations  GASTROINTESTINAL: No abdominal pain. No nausea, vomiting, or hematemesis; No diarrhea or constipation. No melena or hematochezia.  GENITOURINARY: No dysuria, frequency or hematuria  NEUROLOGICAL: + UE numbness.  SKIN: No itching or rash  All other review of systems is negative unless indicated above    VITAL SIGNS:   Vital Signs Last 24 Hrs  T(C): 36.6 (11 Jun 2023 23:51), Max: 37.1 (11 Jun 2023 16:31)  T(F): 97.9 (11 Jun 2023 23:51), Max: 98.8 (11 Jun 2023 16:31)  HR: 52 (12 Jun 2023 08:36) (50 - 59)  BP: 115/84 (12 Jun 2023 08:36) (115/84 - 131/77)  BP(mean): 95 (11 Jun 2023 21:55) (93 - 95)  RR: 18 (12 Jun 2023 08:36) (16 - 18)  SpO2: 100% (12 Jun 2023 08:36) (99% - 100%)    Parameters below as of 12 Jun 2023 08:36  Patient On (Oxygen Delivery Method): room air        I&O's Summary      PHYSICAL EXAM:  Constitutional: NAD, awake and alert  HEENT:  EOMI,  Pupils round, No oral cyanosis.  Pulmonary: Non-labored, breath sounds are clear bilaterally, No wheezing, rales or rhonchi  Cardiovascular: S1 and S2, regular rate and rhythm, no Murmurs, gallops or rubs  Gastrointestinal: Bowel Sounds present, soft, nontender.   Lymph: No peripheral edema. No cervical lymphadenopathy.  Neurological: Alert, no focal deficits  Skin: No rashes.  Psych:  Mood & affect appropriate    LABS:                        15.1   4.43  )-----------( 198      ( 12 Jun 2023 08:24 )             45.9                         14.3   5.82  )-----------( 180      ( 11 Jun 2023 16:42 )             43.5     12 Jun 2023 08:24    137    |  103    |  8      ----------------------------<  91     4.1     |  31     |  1.07   11 Jun 2023 16:42    138    |  103    |  9      ----------------------------<  89     4.3     |  30     |  1.11     Ca    9.1        12 Jun 2023 08:24  Ca    9.1        11 Jun 2023 16:42    TPro  8.1    /  Alb  3.5    /  TBili  0.6    /  DBili  x      /  AST  9      /  ALT  24     /  AlkPhos  71     11 Jun 2023 16:42    PT/INR - ( 11 Jun 2023 16:42 )   PT: 12.9 sec;   INR: 1.11 ratio         PTT - ( 11 Jun 2023 16:42 )  PTT:32.5 sec        06-12 @ 08:24  TSH: 2.90    Radiology/Echo: reviewed    < from: TTE Echo Complete w/o Contrast w/ Doppler (06.12.23 @ 12:16) >   Impression     Summary     The left ventricle is normal in size, wall thickness, wall motion .   Mildly reduced left ventricular systolic function.   Estimated left ventricular ejection fraction is 40-45 %.   Normal appearing mitral valve structure and function.   Mild (1+) mitral regurgitation is present.   Normal appearing tricuspid valve structure and function.   At least Trace tricuspid valve regurgitation is present.     Signature     ----------------------------------------------------------------   Electronically signed by Jonh Colin MD(Interpreting   physician) on 06/12/2023 03:26 PM   --------------------------------------------    < end of copied text >      < from: MR Head No Cont (06.12.23 @ 09:25) >  IMPRESSION: No acute hemorrhage mass mass effect or acute territorial   infarcts seen.    --- End of Report ---    < end of copied text >

## 2023-06-12 NOTE — SWALLOW BEDSIDE ASSESSMENT ADULT - NS SPL SWALLOW CLINIC TRIAL FT
Pt exhibited Oropharyngeal Swallowing integrity which subjectively appeared to be functional for age. Bolus formation/transfer were mechanically functional for age and laryngeal lift on palpation during swallowing trials was felt to be functional for age as well. NO behavioral aspiration signs exhibited. No change in O2 sats noted. Odynophagia was denied.

## 2023-06-12 NOTE — SWALLOW BEDSIDE ASSESSMENT ADULT - SWALLOW EVAL: DIAGNOSIS
1) Pt exhibits Oropharyngeal Swallowing integrity which subjectively appeared to be functional for age. Bolus formation/transfer were mechanically functional for age and laryngeal lift on palpation during swallowing trials was felt to be functional for age as well. NO behavioral aspiration signs exhibited. No change in O2 sats noted. Odynophagia was denied.

## 2023-06-12 NOTE — SWALLOW BEDSIDE ASSESSMENT ADULT - SWALLOW EVAL: RECOMMENDED DIET
SUGGEST A REGULAR TEXTURE DIET WITH THIN LIQUID CONSISTENCIES AS THE PATIENT APPEARED CLINICALLY TOLERANT OF THESE FOOD TEXTURES FROM AN OROPHARYNGEAL SWALLOWING PERSPECTIVE ON EXAM.

## 2023-06-12 NOTE — PROGRESS NOTE ADULT - SUBJECTIVE AND OBJECTIVE BOX
Patient is a 51 y/o M with PMHx HLD prediabetes presenting to  ED on 6/11/23 for upper extremity numbness that started in his thumbs and radiated up the arms (L sided more than R) as well as L toe numbness.    He also mentions a tightness in his L arm that then radiated to his chest (substernal location). At the time of my evaluation he reports that most of his symptoms have resolved except for some residual numbness in his fingers. He denies palpitations, shortness of breath, nausea, vomiting, headaches, dizziness, abdominal pain, urinary frequency/dysuria, sudden confusion, trouble speaking, vision problems, and loss of balance.     Upon arrival to the ED, /72 HR 56 RR 16 and T 98.8 F.  NIHSS of 0. EKG was normal sinus rhythm. CT Brain Perfusion with no evidence of perfusion deficit. CT Angio  Neck/Brain Stroke protocol with no evidence of large vessel occlusion, significant stenosis, vascular malformation or aneurysm within the neck/intracranial circulation. He received 324 mg of aspirin.     No prior hospital admissions.     6/12/23: Patient seen and examined at bedside. AAOx3, no complaints. Chest pain resolved. mild numbness only in his first 2 digits of left hand      Objective:   Vital Signs Last 24 Hrs  T(C): 36.6 (11 Jun 2023 23:51), Max: 36.8 (11 Jun 2023 19:18)  T(F): 97.9 (11 Jun 2023 23:51), Max: 98.2 (11 Jun 2023 19:18)  HR: 52 (12 Jun 2023 08:36) (50 - 59)  BP: 115/84 (12 Jun 2023 08:36) (115/84 - 131/77)  BP(mean): 95 (11 Jun 2023 21:55) (93 - 95)  RR: 18 (12 Jun 2023 08:36) (16 - 18)  SpO2: 100% (12 Jun 2023 08:36) (99% - 100%)    O2 Parameters below as of 12 Jun 2023 08:36  Patient On (Oxygen Delivery Method): room air    GENERAL: A&Ox3, non-toxic appearing, no acute distress  HEENT: NCAT, EOMI, oral mucosa moist, pink conjunctiva  RESP: CTABL  CV: RRR, no murmurs/rubs/gallops  ABDOMEN: soft, non-tender, non-distended, no guarding, no CVA tenderness  MSK: no visible deformities, 5/5 strength and FROM all 4 extremities   NEURO: no focal sensory or motor deficits, CN 2-12 grossly intact, sensation intact in all 4 extremities, speech clear   SKIN: warm, normal color, well perfused, no rash  PSYCH: normal affect,     Labs:                           15.1   4.43  )-----------( 198      ( 12 Jun 2023 08:24 )             45.9     06-12    137  |  103  |  8   ----------------------------<  91  4.1   |  31  |  1.07    Ca    9.1      12 Jun 2023 08:24    TPro  8.1  /  Alb  3.5  /  TBili  0.6  /  DBili  x   /  AST  9<L>  /  ALT  24  /  AlkPhos  71  06-11      Radiology:     < from: MR Head No Cont (06.12.23 @ 09:25) >  INTERPRETATION:  Clinical indication: Upper extremity numbness.    MRI of the brain was performed sagittal T1 axial T1 T2 T2 FLAIR diffusion   and gradient echo sequence.    The lateral ventricles have a normal configuration    Abnormal T2 prolongation in the appearance white matter region is   identified which is nonspecific. These findings could be related to   migraine headaches though possibilities include areas infection/ischemia   or demyelinating disease.    No acute hemorrhage mass or mass effect is seen.    The large vessels demonstrate normal flow voids.    The visualized paranasal sinuses mastoid and middle ear regions appear   clear.    IMPRESSION: No acute hemorrhage mass mass effect or acute territorial   infarcts seen.    --- End of Report ---    < end of copied text >  < from: CT Brain Perfusion Maps Stroke (06.11.23 @ 16:49) >  IMPRESSION:    No acute intracranial abnormality.    These findings were discussed with Dr. Mensah on 6/11/2023, 1644 hours.    No evidence of CT perfusion deficit.    No evidence of large vessel occlusion, significant stenosis, vascular   malformation or aneurysm within the neck/intracranial circulation.    < end of copied text >      --------------  MEDICATIONS  (STANDING):  aspirin  chewable 81 milliGRAM(s) Oral daily  atorvastatin 80 milliGRAM(s) Oral at bedtime  ferrous    sulfate 325 milliGRAM(s) Oral daily  heparin   Injectable 5000 Unit(s) SubCutaneous every 12 hours  magnesium oxide 400 milliGRAM(s) Oral daily  polyethylene glycol 3350 17 Gram(s) Oral daily  senna 2 Tablet(s) Oral at bedtime

## 2023-06-12 NOTE — DISCHARGE NOTE NURSING/CASE MANAGEMENT/SOCIAL WORK - NSDCPEFALRISK_GEN_ALL_CORE
For information on Fall & Injury Prevention, visit: [email or web address] OR  [email or web address] OR  https://www.cdc.gov/steadi/patient.html

## 2023-06-12 NOTE — CONSULT NOTE ADULT - SUBJECTIVE AND OBJECTIVE BOX
Neurology Consult requested by:   Patient is a 52y old  Male who presents with a chief complaint of TIA/rule out CVA (11 Jun 2023 20:14)     HPI:  Patient is a 53 y/o man with no significant PMH presenting to the ED on 6/11/23 for upper extremity numbness that started in his thumbs and radiated up the arms (L sided more than R) as well as L toe numbness.  He also mentions a tightness in his L arm that then radiated to his chest (substernal location). At present no complaints.  He denies palpitations, shortness of breath, nausea, vomiting, headaches, dizziness, abdominal pain, urinary frequency/dysuria, sudden confusion, trouble speaking, vision problems, and loss of balance.       PAST MEDICAL & SURGICAL HISTORY:  No pertinent past medical history      No significant past surgical history        FAMILY HISTORY:  Family history of stroke (Father)    Family hx of colon cancer (Mother)      Social Hx:  Nonsmoker, no drug or alcohol use  Medications and Allergies ReviewedMEDICATIONS  (STANDING):  aspirin  chewable 81 milliGRAM(s) Oral daily  atorvastatin 80 milliGRAM(s) Oral at bedtime  ferrous    sulfate 325 milliGRAM(s) Oral daily  heparin   Injectable 5000 Unit(s) SubCutaneous every 12 hours  magnesium oxide 400 milliGRAM(s) Oral daily  polyethylene glycol 3350 17 Gram(s) Oral daily  senna 2 Tablet(s) Oral at bedtime     ROS: Pertinent positives in HPI, all other ROS were reviewed and are negative.      Examination:   Vital Signs Last 24 Hrs  T(C): 36.6 (11 Jun 2023 23:51), Max: 37.1 (11 Jun 2023 16:31)  T(F): 97.9 (11 Jun 2023 23:51), Max: 98.8 (11 Jun 2023 16:31)  HR: 52 (12 Jun 2023 08:36) (50 - 59)  BP: 115/84 (12 Jun 2023 08:36) (115/84 - 131/77)  BP(mean): 95 (11 Jun 2023 21:55) (93 - 95)  RR: 18 (12 Jun 2023 08:36) (16 - 18)  SpO2: 100% (12 Jun 2023 08:36) (99% - 100%)    Parameters below as of 12 Jun 2023 08:36  Patient On (Oxygen Delivery Method): room air      General: Cooperative, NAD   NECK: supple, no masses  ENT: Normal hearing   Vascular : no carotid bruits,   Lungs: CTAB  Chest: RRR, no murmurs  Extremities: nontender, no edema, + Phalen's sign  Musculoskeletal: no adventitious movements, no joint stiffness  Skin: no rash    Neurological Examination:  NIHSS:0  MS: AOx3. Appropriately interactive, normal affect. Speech fluent w/o paraphasic error, repetition, naming, reading is intact   CN: No Papilledema, PERLL, EOMI, V1-3 sensation intact, face symmetric, hearing intact, palate elevates symmetrically, tongue midline, SCM equal bilaterally  Motor: normal bulk and tone, no tremor, rigidity or bradykinesia.  5/5 all over   Sens: Intact to light touch.    Reflexes: 2/4 all over, downgoing toes b/l  Coord:  No dysmetria, ARRON intact   Gait: Normal gait    Labs: Reviewed  Complete Blood Count (06.12.23 @ 08:24)   WBC Count: 4.43 K/uL  RBC Count: 5.17 M/uL  Hemoglobin: 15.1 g/dL  Hematocrit: 45.9 %  Mean Cell Volume: 88.8 fl  Mean Cell Hemoglobin: 29.2 pg  Mean Cell Hemoglobin Conc: 32.9 gm/dL  Red Cell Distrib Width: 14.7 %  Platelet Count - Automated: 198 K/uL    Basic Metabolic Panel (06.12.23 @ 08:24)   Sodium, Serum: 137 mmol/L  Potassium, Serum: 4.1 mmol/L  Chloride, Serum: 103 mmol/L  Carbon Dioxide, Serum: 31 mmol/L  Anion Gap, Serum: 3 mmol/L  Blood Urea Nitrogen, Serum: 8 mg/dL  Creatinine, Serum: 1.07 mg/dL  Glucose, Serum: 91 mg/dL  Calcium, Total Serum: 9.1 mg/dL  eGFR: 84:       < from: MR Head No Cont (06.12.23 @ 09:25) >  INTERPRETATION:  Clinical indication: Upper extremity numbness.    MRI of the brain was performed sagittal T1 axial T1 T2 T2 FLAIR diffusion   and gradient echo sequence.    The lateral ventricles have a normal configuration    Abnormal T2 prolongation in the appearance white matter region is   identified which is nonspecific. These findings could be related to   migraine headaches though possibilities include areas infection/ischemia   or demyelinating disease.    No acute hemorrhage mass or mass effect is seen.    The large vessels demonstrate normal flow voids.    The visualized paranasal sinuses mastoid and middle ear regions appear   clear.    IMPRESSION: No acute hemorrhage mass mass effect or acute territorial   infarcts seen.    < end of copied text >      < from: CT Brain Perfusion Maps Stroke (06.11.23 @ 16:49) >    IMPRESSION:    No acute intracranial abnormality.    These findings were discussed with Dr. Mensah on 6/11/2023, 1644 hours.    No evidence of CT perfusion deficit.    No evidence of large vessel occlusion, significant stenosis, vascular   malformation or aneurysm within the neck/intracranial circulation.    < end of copied text >                Imaging: Reviewed    A/P:    No IV tpa given because…  Total Critical Care Time spent:

## 2023-06-12 NOTE — SWALLOW BEDSIDE ASSESSMENT ADULT - COMMENTS
The pt was admitted to  with acute onset of paresthesia to left fingers that migrated up to LUE. These symptoms are improving. Brain MRI and head CT negative for acute stroke. [email or web address]

## 2023-06-12 NOTE — SWALLOW BEDSIDE ASSESSMENT ADULT - SWALLOW EVAL: CRITERIA FOR SKILLED INTERVENTION MET
DO NOT FEEL THAT ACUTE SPEECH PATHOLOGY FOLLOW UP WOULD CHANGE CLINICAL MANAGEMENT/OUTCOME IN HOSPITAL SETTING. PT'S SPEECH-LANGUAGE INTEGRITY AND OROPHARYNGEAL SWALLOWING INTEGRITY ARE FELT TO BE FUNCTIONAL/AT BASELINE/MAXIMIZED. GIVEN ABOVE, THIS SERVICE WILL NOT ACTIVELY FOLLOW. RECONSULT PRN SHOULD STATUS CHANGE AND CONDITION WARRANT.

## 2023-06-12 NOTE — DISCHARGE NOTE NURSING/CASE MANAGEMENT/SOCIAL WORK - PATIENT PORTAL LINK FT
You can access the FollowMyHealth Patient Portal offered by [facility] by registering at the following website: [email or web address]. By joining [employer]’s FollowMyHealth portal, you will also be able to view your health information using other applications (apps) compatible with our system.

## 2023-06-12 NOTE — SWALLOW BEDSIDE ASSESSMENT ADULT - SLP GENERAL OBSERVATIONS
The pt was alert and interactive. His receptive language abilities were felt to be preserved and he was able to verbalize during communicative probes without evidence of a primary motor speech or primary linguistic pathology. Pt is communicatively competent and at reported communicative baseline.

## 2023-06-12 NOTE — PHYSICAL THERAPY INITIAL EVALUATION ADULT - GENERAL OBSERVATIONS, REHAB EVAL
Patient received in bed on 3N, +HM. Patient denied pain. Still c/o mild L hand paresthesias in radial nerve distribution, mostly in thumb.

## 2023-06-12 NOTE — PROVIDER CONTACT NOTE (OTHER) - ASSESSMENT
Pt is A&Ox4, pt has a visitor bedside, pt denies any pain, SOB, weakness, pt states "I feel good I was seen by PT, I walked up and down the stairs"

## 2023-06-12 NOTE — PROGRESS NOTE ADULT - ASSESSMENT
Patient is a 51 y/o M with PMHx HLD prediabetes presenting for left sided hand/arm numbess and chest pain, admitted for:     #TIA/rule out stroke   - Experienced paresthesias in upper and lower extremity - improved and only involved left first 2 digits   - CT head and CT angio head/neck unremarkable as above   - MRI head No acute hemorrhage mass mass effect or acute territorial infarcts seen.  - dysphagia screen, wnl   - A1C 5.9, LDL-C 124, HDL 69  - c/w atorvastatin 80 mg at bedtime  -  neuro consult appreciated Dr. Domínguez : nonfocal exam, possible carpal tunnel syndrome. F/u outpatient  - PT appreciated : independent   - F/u speech pathology       #Substernal Chest pain   #Rule out ACS   #abnormal echo   - Chest pain now resolved   - Family Hx - father passed away with stroke in his 70s - had hx of HLD and T2DM  - EKG - normal sinus rhythm   - no events on tele over 24 hours, just sinus bradycardia 40s-50s   - Troponin negative x 3  - Abnormal Echo EF 40-45%   - Cardiology consult Appreciated, Dr. Love: abnromal echo, no RWMA, chemical Stress test in AM, NPO after midnight. May need ZIO on discharge. continue ASA and statin   - of note patient had an Echo of with 60% EF, and normal stress test in May 2022 [facility] Dr. Ball [facility]     #DVT ppx  - Heparin SubQ    #Code Status   - FULL CODE    Above discussed with Dr. Lisa

## 2023-06-12 NOTE — CONSULT NOTE ADULT - PROBLEM SELECTOR RECOMMENDATION 9
non exertional right chest pressure associated with left arm numbness that lasted for  few hrs yesterday, denies SOB/palpitations, unlikely ACS, pt. with Hx of stomach ulcers - had EGD a few years ago, likely atypical chest pain/GERD  Recommendation: tele NSR 60 with mild bryant 50s - pt. says this is normal for him,. chest pain free, normal ECG, MRI head - no CVA, CE neg x 3, normal BP, if Echo - normal can dc from cardiac standpoint . Pt. with non exertional right chest pressure associated with left arm numbness that lasted for  few hrs yesterday, cardiac enzymes negative x 3, chest pain free, positive Fx - father with heart attack in his mid 60s.   Recommendation: pt. with abnormal Echo - reduced LVEF 40-45%, no RWMA, no baseline for comparison, concern for ACS based on symptoms and Family Hx, will have chemical stress tomorrow and likely ZIO on discharge, npo p midnight, cont. asa and statin . Pt. with non exertional right chest pressure associated with left arm numbness that lasted for  few hrs yesterday, cardiac enzymes negative x 3, chest pain free, positive Fx - father with heart attack in his mid 60s.   Recommendation: pt. with abnormal Echo - reduced LVEF 40-45%, no RWMA, no baseline for comparison, concern for ACS based on symptoms and Family Hx, will have chemical stress tomorrow - pt. reluctant to have LHC - prefers to taker it step by step,  npo p midnight, cont. asa and statin

## 2023-06-12 NOTE — PHYSICAL THERAPY INITIAL EVALUATION ADULT - PERTINENT HX OF CURRENT PROBLEM, REHAB EVAL
53 yo M admitted  c/o upper extremity numbness that started in his thumbs and radiated up the arms (L sided more than R) as well as L toe numbness. He also mentions a tightness in his L arm that then radiated to his chest (substernal location). CT head (-).  CT Brain Perfusion with no evidence of perfusion deficit. CT Angio  Neck/Brain Stroke protocol with no evidence of large vessel occlusion, significant stenosis, vascular malformation or aneurysm within the neck/intracranial circulation. MRI head: (-).

## 2023-06-12 NOTE — PHYSICAL THERAPY INITIAL EVALUATION ADULT - MODALITIES TREATMENT COMMENTS
Discussed OPPT services post an ortho work up of CS/LS if indicated. Patient returned to bed by request, call bell in reach and bed alarm active. Patient independent in functional mobility, no skilled physical therapy need in this setting.  May ambulate per nursing.  Will d/c from PT. RN, CM informed.

## 2023-06-13 VITALS
RESPIRATION RATE: 17 BRPM | SYSTOLIC BLOOD PRESSURE: 122 MMHG | TEMPERATURE: 98 F | DIASTOLIC BLOOD PRESSURE: 78 MMHG | OXYGEN SATURATION: 100 % | HEART RATE: 55 BPM

## 2023-06-13 PROCEDURE — 99239 HOSP IP/OBS DSCHRG MGMT >30: CPT | Mod: GC

## 2023-06-13 PROCEDURE — 93016 CV STRESS TEST SUPVJ ONLY: CPT

## 2023-06-13 PROCEDURE — 99233 SBSQ HOSP IP/OBS HIGH 50: CPT

## 2023-06-13 PROCEDURE — 93018 CV STRESS TEST I&R ONLY: CPT

## 2023-06-13 PROCEDURE — 78452 HT MUSCLE IMAGE SPECT MULT: CPT | Mod: 26

## 2023-06-13 RX ORDER — REGADENOSON 0.08 MG/ML
0.4 INJECTION, SOLUTION INTRAVENOUS ONCE
Refills: 0 | Status: DISCONTINUED | OUTPATIENT
Start: 2023-06-13 | End: 2023-06-13

## 2023-06-13 RX ORDER — ATORVASTATIN CALCIUM 80 MG/1
1 TABLET, FILM COATED ORAL
Qty: 30 | Refills: 0
Start: 2023-06-13 | End: 2023-07-12

## 2023-06-13 RX ORDER — ATORVASTATIN CALCIUM 80 MG/1
1 TABLET, FILM COATED ORAL
Qty: 30 | Refills: 0
Start: 2023-06-13

## 2023-06-13 RX ADMIN — Medication 81 MILLIGRAM(S): at 11:29

## 2023-06-13 RX ADMIN — MAGNESIUM OXIDE 400 MG ORAL TABLET 400 MILLIGRAM(S): 241.3 TABLET ORAL at 11:29

## 2023-06-13 RX ADMIN — Medication 325 MILLIGRAM(S): at 11:29

## 2023-06-13 RX ADMIN — POLYETHYLENE GLYCOL 3350 17 GRAM(S): 17 POWDER, FOR SOLUTION ORAL at 11:29

## 2023-06-13 NOTE — CHART NOTE - NSCHARTNOTEFT_GEN_A_CORE
Regadenason Stress Test Report    NELL OREILLY 52yM  MRN: [id number]  : 71  Admit: 23    Regadenason was 5 cc (0.4 mg Regadenason) which was given rapidly over 10 seconds  into a peripheral IV.  Immediately after regadenson injection, flush w/ 5 cc saline given.  Radiopharmeceutical was injected 10-20 sec after the saline flush.  Pt was monitored for 6 minutes.  A 12 lead ECG was recorded every minute & BP was recorded throughout the test.    Resting ECG: SB  Peak infusion ECG: No additional changes noted.  Chest pain: none    Conclusion:  Normal response to IV lexiscan.  See myocardial perfusion scan report.

## 2023-06-13 NOTE — DISCHARGE NOTE PROVIDER - CARE PROVIDER_API CALL
Ahsan Domínguez  Neurology  [address] [facility], [address]  [place] [address]  Phone: [phone]  Fax: [phone]  Follow Up Time: 1 week    Gomez Ball  Cardiology  [address] [place] [address]  Phone: [phone]  Fax: [phone]  Follow Up Time: 1 week   Ahsan Domínguez  Neurology  [address] [facility], [address]  [place] [address]  Phone: [phone]  Fax: [phone]  Follow Up Time: 1 week    Prashant Thompson)  Cardiology  [address] [place] [address]  Phone: [phone]  Fax: [phone]  Follow Up Time: 1 week

## 2023-06-13 NOTE — DISCHARGE NOTE PROVIDER - NSDCCPCAREPLAN_GEN_ALL_CORE_FT
PRINCIPAL DISCHARGE DIAGNOSIS  Diagnosis: Hand paresthesia  Assessment and Plan of Treatment:   You were admitted to the hospital because you had symptoms of numbness in your bilateral arms associated with chest discomfort which resolved. You were admitted to evaluate you for a transient ischemic attack, or TIA. This is when a blood clot temporarily blocks a blood vessel in your brain to cause you temporary symptoms but does not last long enough to cause permanent damage. A TIA is a warning sign of a future stroke, which can permanently damage areas in the brain that control parts of the body. Your MRI and CT scan did not indicate any damage to the brain or signs of stroke. It is important to treat a TIA to prevent strokes. We increased your lipitor to 80 mg to lower your cholesterol. Please continue to take this and your aspirin. You were evaluated by neurology who said your symptoms could be related to carpal tunnel syndrome instead. Please follow up with Neurology and your PCP for further workup of these symptoms.   Call 911 if you or someone you know experiences the following symptoms of stroke (can be remembered by BE FAST):  •Balance: Dizziness, loss of balance, or a sense of falling  •Eyes: Sudden double vision or blurred vision  •Face: drooping of one side of the face  •Arm: arm weakness  •Speech:  Sudden trouble talking or slurred speech, trouble understanding others  •Time: Time to call for an ambulance fast!      SECONDARY DISCHARGE DIAGNOSES  Diagnosis: Sinus bradycardia  Assessment and Plan of Treatment: You had a cardiac workup since you reported chest pain. It was noticed that you had a slow heart rate. Your EKG and cardiac enzymes were normal. You denied any other symptoms of leg swelling, shortness of breath, or dyspnea on exertion. You had an echocardiogram which showed you had a reduced EF of 40-45%. You had a subsequent stress test which did not show any perfusion defects, and EF of 54%. Please follow up with your cardiologist for further evaluation of your heart.     PRINCIPAL DISCHARGE DIAGNOSIS  Diagnosis: Hand paresthesia  Assessment and Plan of Treatment: You were admitted to the hospital because you had symptoms of numbness in your bilateral arms associated with chest discomfort which resolved. You were admitted to evaluate you for a transient ischemic attack, or TIA. This is when a blood clot temporarily blocks a blood vessel in your brain to cause you temporary symptoms but does not last long enough to cause permanent damage. A TIA is a warning sign of a future stroke, which can permanently damage areas in the brain that control parts of the body. Your MRI and CT scan did not indicate any damage to the brain or signs of stroke. It is important to treat a TIA to prevent strokes. We increased your lipitor to 40 mg to lower your cholesterol. Please continue to take this and your aspirin. You were evaluated by neurology who said your symptoms could be related to carpal tunnel syndrome instead. Please follow up with Neurology and your PCP for further workup of these symptoms.   Call 911 if you or someone you know experiences the following symptoms of stroke (can be remembered by BE FAST):  •Balance: Dizziness, loss of balance, or a sense of falling  •Eyes: Sudden double vision or blurred vision  •Face: drooping of one side of the face  •Arm: arm weakness  •Speech:  Sudden trouble talking or slurred speech, trouble understanding others  •Time: Time to call for an ambulance fast!      SECONDARY DISCHARGE DIAGNOSES  Diagnosis: Sinus bradycardia  Assessment and Plan of Treatment: You had a cardiac workup since you reported chest pain. It was noticed that you had a slow heart rate. Your EKG and cardiac enzymes were normal. You denied any other symptoms of leg swelling, shortness of breath, or dyspnea on exertion. You had an echocardiogram which showed you had a reduced EF of 40-45%. You had a subsequent stress test which did not show any perfusion defects, and EF of 54%. Please follow up with your cardiologist for further evaluation of your heart.

## 2023-06-13 NOTE — DISCHARGE NOTE PROVIDER - PROVIDER TOKENS
PROVIDER:[TOKEN:[[id number]],FOLLOWUP:[1 week]],PROVIDER:[TOKEN:[[id number]],FOLLOWUP:[1 week]] PROVIDER:[TOKEN:[[id number]],FOLLOWUP:[1 week]],PROVIDER:[TOKEN:[[id number]],FOLLOWUP:[1 week]]

## 2023-06-13 NOTE — PROGRESS NOTE ADULT - PROBLEM SELECTOR PLAN 1
Problem: Chest pain.   ·  Recommendation: . Pt. with non exertional right chest pressure associated with left arm numbness that lasted for  few hrs yesterday, cardiac enzymes negative x 3, chest pain free, +FH CAD (dad MI mid 60s).     Recommendation: Echo showed reduced LVEF 40-45%, no RWMA, Nuclear stress test revealed normal perfusion and normal LVF, EF 54%. Continue statin and aspirin

## 2023-06-13 NOTE — DISCHARGE NOTE PROVIDER - HOSPITAL COURSE
Patient is a 53 y/o M with PMHx HLD prediabetes presented to  ED on 6/11/23 for upper extremity numbness that started in his thumbs and radiated up the arms L>R as well as chest discomfort. Upon arrival to the ED, /72 HR 56 RR 16 and T 98.8 F.  NIHSS of 0. EKG was normal sinus rhythm. Troponin negative x3.  CT Brain Perfusion with no evidence of perfusion deficit. CT Angio  Neck/Brain Stroke protocol with no evidence of large vessel occlusion, significant stenosis, vascular malformation or aneurysm within the neck/intracranial circulation. He received 324 mg of aspirin and was admitted for further workup to rule out TIA or Stroke. The patient received an MRI which showed no evidence of acute infarct. Neuro was consulted who suggested the patients symptoms could be a possible result of carpal tunnel. The patient also had an Echocardiogram which showed an EF of 40-45%. Cardiology was consulted and the patient had a chemical stress test which showed no evidence of perfusion defect. The patient will follow up with both his cardiologist and neurologist as an outpatient.      6/13/23: Patient seen and examined at bedside. AAOx3, no complaints. Denies chest pain. Numbness in his digits resolved.     Vital Signs Last 24 Hrs  T(C): 36.6 (13 Jun 2023 08:46), Max: 36.7 (12 Jun 2023 20:51)  T(F): 97.9 (13 Jun 2023 08:46), Max: 98 (12 Jun 2023 20:51)  HR: 55 (13 Jun 2023 08:46) (50 - 58)  BP: 122/78 (13 Jun 2023 08:46) (114/68 - 137/78)  BP(mean): 83 (13 Jun 2023 04:00) (83 - 83)  RR: 17 (13 Jun 2023 08:46) (17 - 18)  SpO2: 100% (13 Jun 2023 08:46) (99% - 100%)    Parameters below as of 13 Jun 2023 08:46  Patient On (Oxygen Delivery Method): room air    GENERAL: A&Ox3, non-toxic appearing, no acute distress  HEENT: NCAT, EOMI, oral mucosa moist, pink conjunctiva  RESP: CTABL  CV: RRR, no murmurs/rubs/gallops  ABDOMEN: soft, non-tender, non-distended, no guarding, no CVA tenderness  MSK: no visible deformities, 5/5 strength and FROM all 4 extremities   NEURO: no focal sensory or motor deficits, CN 2-12 grossly intact, sensation intact in all 4 extremities, speech clear   SKIN: warm, normal color, well perfused, no rash  PSYCH: normal affect    You were admitted to the hospital because you had symptoms of numbness in your bilateral arms associated with chest discomfort which resolved. You were admitted to evaluate you for a transient ischemic attack, or TIA. This is when a blood clot temporarily blocks a blood vessel in your brain to cause you temporary symptoms but does not last long enough to cause permanent damage. A TIA is a warning sign of a future stroke, which can permanently damage areas in the brain that control parts of the body. Your MRI and CT scan did not indicate any damage to the brain or signs of stroke. It is important to treat a TIA to prevent strokes. We increased your lipitor to 80 mg to lower your cholesterol. Please continue to take this and your aspirin. You were evaluated by neurology who said your symptoms could be related to carpal tunnel syndrome instead. Please follow up with Neurology and your PCP for further workup of these symptoms.     Call 911 if you or someone you know experiences the following symptoms of stroke (can be remembered by BE FAST):  •Balance: Dizziness, loss of balance, or a sense of falling  •Eyes: Sudden double vision or blurred vision  •Face: drooping of one side of the face  •Arm: arm weakness  •Speech:  Sudden trouble talking or slurred speech, trouble understanding others  •Time: Time to call for an ambulance fast!    You had a cardiac workup since you reported chest pain. It was noticed that you had a slow heart rate. You denied any other symptoms of leg swelling, shortness of breath, or dyspnea on exertion. You had an echo Patient is a 53 y/o M with PMHx HLD prediabetes presented to  ED on 6/11/23 for upper extremity numbness that started in his thumbs and radiated up the arms L>R as well as chest discomfort. Upon arrival to the ED, /72 HR 56 RR 16 and T 98.8 F.  NIHSS of 0. EKG was normal sinus rhythm. Troponin negative x3.  CT Brain Perfusion with no evidence of perfusion deficit. CT Angio  Neck/Brain Stroke protocol with no evidence of large vessel occlusion, significant stenosis, vascular malformation or aneurysm within the neck/intracranial circulation. He received 324 mg of aspirin and was admitted for further workup to rule out TIA or Stroke. The patient received an MRI which showed no evidence of acute infarct. Neuro was consulted who suggested the patients symptoms could be a possible result of carpal tunnel. The patient also had an Echocardiogram which showed an EF of 40-45%. Cardiology was consulted and the patient had a chemical stress test which showed no evidence of perfusion defect. The patient will follow up with both his cardiologist and neurologist as an outpatient.      6/13/23: Patient seen and examined at bedside. AAOx3, no complaints. Denies chest pain. Numbness in his digits resolved.     Vital Signs Last 24 Hrs  T(C): 36.6 (13 Jun 2023 08:46), Max: 36.7 (12 Jun 2023 20:51)  T(F): 97.9 (13 Jun 2023 08:46), Max: 98 (12 Jun 2023 20:51)  HR: 55 (13 Jun 2023 08:46) (50 - 58)  BP: 122/78 (13 Jun 2023 08:46) (114/68 - 137/78)  BP(mean): 83 (13 Jun 2023 04:00) (83 - 83)  RR: 17 (13 Jun 2023 08:46) (17 - 18)  SpO2: 100% (13 Jun 2023 08:46) (99% - 100%)    Parameters below as of 13 Jun 2023 08:46  Patient On (Oxygen Delivery Method): room air    GENERAL: A&Ox3, non-toxic appearing, no acute distress  HEENT: NCAT, EOMI, oral mucosa moist, pink conjunctiva  RESP: CTABL  CV: RRR, no murmurs/rubs/gallops  ABDOMEN: soft, non-tender, non-distended, no guarding, no CVA tenderness  MSK: no visible deformities, 5/5 strength and FROM all 4 extremities   NEURO: no focal sensory or motor deficits, CN 2-12 grossly intact, sensation intact in all 4 extremities, speech clear   SKIN: warm, normal color, well perfused, no rash  PSYCH: normal affect

## 2023-06-13 NOTE — PROGRESS NOTE ADULT - PROBLEM SELECTOR PLAN 2
Problem: HLD (hyperlipidemia).   ·  Recommendation: abnormal lipid panel, cont. statin.    Will sign off.  Pt should follow up in our office in 1-2 weeks

## 2023-06-13 NOTE — DISCHARGE NOTE PROVIDER - NSDCMRMEDTOKEN_GEN_ALL_CORE_FT
aspirin 81 mg oral tablet, chewable: 1 tab(s) orally once a day  cholecalciferol 25 mcg (1000 intl units) oral tablet: 1 tab(s) orally once a day  ferrous sulfate 325 mg (65 mg elemental iron) oral tablet: 1 tab(s) orally once a day  Lipitor 20 mg oral tablet: 1 tab(s) orally  magnesium oxide 400 mg oral tablet: 1 tab(s) orally once a day   aspirin 81 mg oral tablet, chewable: 1 tab(s) orally once a day  atorvastatin 80 mg oral tablet: 1 tab(s) orally once a day (at bedtime)  cholecalciferol 25 mcg (1000 intl units) oral tablet: 1 tab(s) orally once a day  ferrous sulfate 325 mg (65 mg elemental iron) oral tablet: 1 tab(s) orally once a day  magnesium oxide 400 mg oral tablet: 1 tab(s) orally once a day   aspirin 81 mg oral tablet, chewable: 1 tab(s) orally once a day  atorvastatin 40 mg oral tablet: 1 tab(s) orally once a day (at bedtime)  cholecalciferol 25 mcg (1000 intl units) oral tablet: 1 tab(s) orally once a day  ferrous sulfate 325 mg (65 mg elemental iron) oral tablet: 1 tab(s) orally once a day  magnesium oxide 400 mg oral tablet: 1 tab(s) orally once a day

## 2023-06-13 NOTE — DISCHARGE NOTE PROVIDER - NSDCCAREPROVSEEN_GEN_ALL_CORE_FT
Mgbeojisarkis, Roxanne Thompson, Prashant Domínguez, Ahsan Arevalo, Stephanie Russell, Yesenia Lauren, Meño Rincon, Luz Marina Meeks, Lawrence Nath, Nasima Araiza, Ed Lisa, Emmanuel Coughlin, Germain

## 2023-06-13 NOTE — PROGRESS NOTE ADULT - SUBJECTIVE AND OBJECTIVE BOX
CHIEF COMPLAINT: Patient is a 52y old  Male who presents with a chief complaint of TIA/rule out CVA (12 Jun 2023 11:16)      HPI:  Patient is a 51 y/o M with no significant PMH presenting to the ED on 6/11/23 for upper extremity numbness that started in his thumbs and radiated up the arms (L sided more than R) as well as L toe numbness.    He also mentions a tightness in his L arm that then radiated to his chest (substernal location). At the time of my evaluation he reports that most of his symptoms have resolved except for some residual numbness in his fingers. He denies palpitations, shortness of breath, nausea, vomiting, headaches, dizziness, abdominal pain, urinary frequency/dysuria, sudden confusion, trouble speaking, vision problems, and loss of balance.     Upon arrival to the ED, /72 HR 56 RR 16 and T 98.8 F.  NIHSS of 0. EKG was normal sinus rhythm. CT Brain Perfusion with no evidence of perfusion deficit. CT Angio  Neck/Brain Stroke protocol with no evidence of large vessel occlusion, significant stenosis, vascular malformation or aneurysm within the neck/intracranial circulation. He received 324 mg of aspirin.     Cardiology consulted to evaluate for chest pain. Pt. with non exertional right chest pressure associated with left arm numbness that lasted for  few hrs yesterday, cardiac enzymes negative x 3, chest pain free, positive Fx - father with heart attack in his mid 60s.     No prior hospital admissions.    (11 Jun 2023 20:14)    6/13/23: Pt feeling good. Offers no complaints of cp or sob.  Tele: SB      MEDICATIONS  (STANDING):  aspirin  chewable 81 milliGRAM(s) Oral daily  atorvastatin 80 milliGRAM(s) Oral at bedtime  ferrous    sulfate 325 milliGRAM(s) Oral daily  heparin   Injectable 5000 Unit(s) SubCutaneous every 12 hours  magnesium oxide 400 milliGRAM(s) Oral daily  polyethylene glycol 3350 17 Gram(s) Oral daily  regadenoson Injectable 0.4 milliGRAM(s) IV Push once  senna 2 Tablet(s) Oral at bedtime    Allergies    penicillins (Unknown)    Intolerances      Vital Signs Last 24 Hrs  T(C): 36.6 (13 Jun 2023 08:46), Max: 36.7 (12 Jun 2023 20:51)  T(F): 97.9 (13 Jun 2023 08:46), Max: 98 (12 Jun 2023 20:51)  HR: 55 (13 Jun 2023 08:46) (50 - 58)  BP: 122/78 (13 Jun 2023 08:46) (114/68 - 137/78)  BP(mean): 83 (13 Jun 2023 04:00) (83 - 83)  RR: 17 (13 Jun 2023 08:46) (17 - 18)  SpO2: 100% (13 Jun 2023 08:46) (99% - 100%)    Parameters below as of 13 Jun 2023 08:46  Patient On (Oxygen Delivery Method): room air        I&O's Summary      PHYSICAL EXAM:  Constitutional: NAD, awake and alert  HEENT:  EOMI,  Pupils round, No oral cyanosis.  Pulmonary: Non-labored, breath sounds are clear bilaterally, No wheezing, rales or rhonchi  Cardiovascular: S1 and S2, regular rate and rhythm, no Murmurs, gallops or rubs  Gastrointestinal: Bowel Sounds present, soft, nontender.   Lymph: No peripheral edema.   Neurological: Alert, no focal deficits  Skin: No rashes.  Psych:  Mood & affect appropriate    LABS:                                   15.1   4.43  )-----------( 198      ( 12 Jun 2023 08:24 )             45.9                         14.3   5.82  )-----------( 180      ( 11 Jun 2023 16:42 )             43.5     06-12    137  |  103  |  8   ----------------------------<  91  4.1   |  31  |  1.07    Ca    9.1      12 Jun 2023 08:24    TPro  8.1  /  Alb  3.5  /  TBili  0.6  /  DBili  x   /  AST  9<L>  /  ALT  24  /  AlkPhos  71  06-11      12 Jun 2023 08:24    137    |  103    |  8      ----------------------------<  91     4.1     |  31     |  1.07   11 Jun 2023 16:42    138    |  103    |  9      ----------------------------<  89     4.3     |  30     |  1.11     Ca    9.1        12 Jun 2023 08:24  Ca    9.1        11 Jun 2023 16:42    TPro  8.1    /  Alb  3.5    /  TBili  0.6    /  DBili  x      /  AST  9      /  ALT  24     /  AlkPhos  71     11 Jun 2023 16:42    PT/INR - ( 11 Jun 2023 16:42 )   PT: 12.9 sec;   INR: 1.11 ratio         PTT - ( 11 Jun 2023 16:42 )  PTT:32.5 sec        06-12 @ 08:24  TSH: 2.90    Radiology/Echo: reviewed    < from: TTE Echo Complete w/o Contrast w/ Doppler (06.12.23 @ 12:16) >   Impression     Summary     The left ventricle is normal in size, wall thickness, wall motion .   Mildly reduced left ventricular systolic function.   Estimated left ventricular ejection fraction is 40-45 %.   Normal appearing mitral valve structure and function.   Mild (1+) mitral regurgitation is present.   Normal appearing tricuspid valve structure and function.   At least Trace tricuspid valve regurgitation is present.        < from: MR Head No Cont (06.12.23 @ 09:25) >  IMPRESSION: No acute hemorrhage mass mass effect or acute territorial   infarcts seen.    --- End of Report ---    < end of copied text >    < from: NM Nuclear Stress Pharmacologic Multiple (06.13.23 @ 11:10) >  IMPRESSION: Normal SPECT Myocardial Perfusion Imaging.    No scan evidence of reversible or fixed perfusion defects.    Normal left ventricular contractility with an ejection fraction of 54%   (Normal: 50% or greater).    No regional wall motion abnormalities.    Please refer to cardiac stress test report for dosage of Regadenoson   administered, EKG findings and symptoms during the procedure.    < end of copied text >

## 2023-06-17 DIAGNOSIS — G56.03 CARPAL TUNNEL SYNDROME, BILATERAL UPPER LIMBS: ICD-10-CM

## 2023-06-17 DIAGNOSIS — Z88.0 ALLERGY STATUS TO PENICILLIN: ICD-10-CM

## 2023-06-17 DIAGNOSIS — R73.03 PREDIABETES: ICD-10-CM

## 2023-06-17 DIAGNOSIS — R20.2 PARESTHESIA OF SKIN: ICD-10-CM

## 2023-06-17 DIAGNOSIS — E78.5 HYPERLIPIDEMIA, UNSPECIFIED: ICD-10-CM

## 2023-06-17 DIAGNOSIS — R00.1 BRADYCARDIA, UNSPECIFIED: ICD-10-CM

## 2023-06-17 DIAGNOSIS — R07.9 CHEST PAIN, UNSPECIFIED: ICD-10-CM

## 2023-12-05 NOTE — SWALLOW BEDSIDE ASSESSMENT ADULT - SWALLOW EVAL: PROGNOSIS
12/5/2023      Current Outpatient Medications:     vibegron (GEMTESA) 75 MG TABS tablet, Take 1 tablet by mouth daily, Disp: 30 tablet, Rfl: 11    rosuvastatin (CRESTOR) 10 MG tablet, Take 1 tablet by mouth daily, Disp: , Rfl:     ferrous sulfate (IRON 325) 325 (65 Fe) MG tablet, Take 1 tablet by mouth daily (with breakfast) 3 days a week, Disp: , Rfl:     sacubitril-valsartan (ENTRESTO) 24-26 MG per tablet, Take 1 tablet by mouth 2 times daily (Patient taking differently: Take 1 tablet by mouth 2 times daily Challenge x 1 week due to cough. If no change resume normal dose.), Disp: 180 tablet, Rfl: 3    Prenatal MV & Min w/FA-DHA (PRENATAL ADULT GUMMY/DHA/FA) 0.4-25 MG CHEW, Take by mouth daily, Disp: , Rfl:     carvedilol (COREG) 6.25 MG tablet, TAKE 1 TABLET TWICE DAILY WITH MEALS (Patient taking differently: Challenge x 1 week due to cough/resume if no change. TAKE 1 TABLET TWICE DAILY WITH MEALS), Disp: 180 tablet, Rfl: 3    spironolactone (ALDACTONE) 25 MG tablet, TAKE 1/2 TABLET EVERY DAY, Disp: 45 tablet, Rfl: 3    amoxicillin (AMOXIL) 500 MG capsule, Take 1 capsule by mouth, Disp: , Rfl:     aspirin 81 MG EC tablet, Take 1 tablet by mouth 2 times daily, Disp: , Rfl:     calcium carb-cholecalciferol 600-400 MG-UNIT TABS per tab, Take 1 tablet by mouth 2 times daily, Disp: , Rfl:     Cholecalciferol 50 MCG (2000 UT) TABS, Take by mouth (Patient not taking: Reported on 11/1/2023), Disp: , Rfl:     metFORMIN (GLUCOPHAGE) 1000 MG tablet, Take 1 tablet by mouth 2 times daily, Disp: , Rfl:     nitroGLYCERIN (NITROSTAT) 0.4 MG SL tablet, Place 1 tablet under the tongue, Disp: , Rfl:     omeprazole (PRILOSEC) 40 MG delayed release capsule, Take 1 capsule by mouth daily, Disp: , Rfl:     traMADol (ULTRAM) 50 MG tablet, Take 1 tablet by mouth 2 times daily. , Disp: , Rfl:     Allergies   Allergen Reactions    Oxycodone Other (See Comments) and Rash     Redness to trunk (chest, back)  Red rash all over body along with
2) The pt was alert and interactive. His receptive language abilities were felt to be preserved and he was able to verbalize during communicative probes without evidence of a primary motor speech or primary linguistic pathology. Pt is communicatively competent and at reported communicative baseline.